# Patient Record
Sex: MALE | Race: ASIAN | NOT HISPANIC OR LATINO | Employment: FULL TIME | URBAN - METROPOLITAN AREA
[De-identification: names, ages, dates, MRNs, and addresses within clinical notes are randomized per-mention and may not be internally consistent; named-entity substitution may affect disease eponyms.]

---

## 2018-01-07 DIAGNOSIS — I10 BENIGN ESSENTIAL HYPERTENSION: ICD-10-CM

## 2018-01-07 DIAGNOSIS — E78.5 HYPERLIPIDEMIA, UNSPECIFIED HYPERLIPIDEMIA TYPE: ICD-10-CM

## 2018-01-08 RX ORDER — ROSUVASTATIN CALCIUM 40 MG/1
TABLET, COATED ORAL
Qty: 90 TABLET | Refills: 2 | OUTPATIENT
Start: 2018-01-08

## 2018-01-08 RX ORDER — VALSARTAN 160 MG/1
TABLET ORAL
Qty: 90 TABLET | Refills: 2 | OUTPATIENT
Start: 2018-01-08

## 2018-01-15 DIAGNOSIS — E78.5 HYPERLIPIDEMIA, UNSPECIFIED HYPERLIPIDEMIA TYPE: ICD-10-CM

## 2018-01-15 RX ORDER — ROSUVASTATIN CALCIUM 40 MG/1
TABLET, COATED ORAL
Qty: 30 TABLET | Refills: 0 | Status: SHIPPED | OUTPATIENT
Start: 2018-01-15 | End: 2018-02-09 | Stop reason: SDUPTHER

## 2018-01-23 DIAGNOSIS — Z00.00 ROUTINE PHYSICAL EXAMINATION: Primary | ICD-10-CM

## 2018-01-23 DIAGNOSIS — R73.01 IMPAIRED FASTING GLUCOSE: ICD-10-CM

## 2018-01-23 DIAGNOSIS — E55.9 VITAMIN D DEFICIENCY: ICD-10-CM

## 2018-01-28 LAB — TSH SERPL DL<=0.005 MIU/L-ACNC: 0.22 UIU/ML (ref 0.45–4.5)

## 2018-01-31 LAB
25(OH)D3+25(OH)D2 SERPL-MCNC: 35.5 NG/ML (ref 30–100)
ALBUMIN SERPL-MCNC: 4.5 G/DL (ref 3.5–5.5)
ALBUMIN/CREAT UR: 2.1 MG/G CREAT (ref 0–30)
ALBUMIN/GLOB SERPL: 2.3 {RATIO} (ref 1.2–2.2)
ALP SERPL-CCNC: 84 IU/L (ref 39–117)
ALT SERPL-CCNC: 15 IU/L (ref 0–44)
AST SERPL-CCNC: 15 IU/L (ref 0–40)
BILIRUB SERPL-MCNC: 0.2 MG/DL (ref 0–1.2)
BUN SERPL-MCNC: 20 MG/DL (ref 6–24)
BUN/CREAT SERPL: 23 (ref 9–20)
CALCIUM SERPL-MCNC: 9.3 MG/DL (ref 8.7–10.2)
CHLORIDE SERPL-SCNC: 104 MMOL/L (ref 96–106)
CHOLEST SERPL-MCNC: 161 MG/DL (ref 100–199)
CK SERPL-CCNC: 118 U/L (ref 24–204)
CO2 SERPL-SCNC: 26 MMOL/L (ref 18–29)
CREAT SERPL-MCNC: 0.86 MG/DL (ref 0.76–1.27)
CREAT UR-MCNC: 212.1 MG/DL
ERYTHROCYTE [DISTWIDTH] IN BLOOD BY AUTOMATED COUNT: 13 % (ref 12.3–15.4)
GFR SERPLBLD CREATININE-BSD FMLA CKD-EPI: 103 ML/MIN/1.73
GFR SERPLBLD CREATININE-BSD FMLA CKD-EPI: 119 ML/MIN/1.73
GLOBULIN SER CALC-MCNC: 2 G/DL (ref 1.5–4.5)
GLUCOSE SERPL-MCNC: 117 MG/DL (ref 65–99)
HBA1C MFR BLD: 5.7 % (ref 4.8–5.6)
HCT VFR BLD AUTO: 42.3 % (ref 37.5–51)
HDL SERPL-SCNC: 32.1 UMOL/L
HDLC SERPL-MCNC: 38 MG/DL
HGB BLD-MCNC: 13.7 G/DL (ref 13–17.7)
LDL SERPL QN: 20.8 NM
LDL SERPL-SCNC: 1578 NMOL/L
LDL SMALL SERPL-SCNC: 739 NMOL/L
LDLC SERPL CALC-MCNC: 113 MG/DL (ref 0–99)
MCH RBC QN AUTO: 28.2 PG (ref 26.6–33)
MCHC RBC AUTO-ENTMCNC: 32.4 G/DL (ref 31.5–35.7)
MCV RBC AUTO: 87 FL (ref 79–97)
MICROALBUMIN UR-MCNC: 4.5 UG/ML
PLATELET # BLD AUTO: 227 X10E3/UL (ref 150–379)
POTASSIUM SERPL-SCNC: 4.5 MMOL/L (ref 3.5–5.2)
PROT SERPL-MCNC: 6.5 G/DL (ref 6–8.5)
PSA SERPL-MCNC: 0.7 NG/ML (ref 0–4)
RBC # BLD AUTO: 4.86 X10E6/UL (ref 4.14–5.8)
SODIUM SERPL-SCNC: 146 MMOL/L (ref 134–144)
T3FREE SERPL-MCNC: 3 PG/ML (ref 2–4.4)
T4 FREE SERPL-MCNC: 1.57 NG/DL (ref 0.82–1.77)
TRIGL SERPL-MCNC: 49 MG/DL (ref 0–149)
WBC # BLD AUTO: 4.8 X10E3/UL (ref 3.4–10.8)

## 2018-02-09 ENCOUNTER — OFFICE VISIT (OUTPATIENT)
Dept: INTERNAL MEDICINE | Facility: CLINIC | Age: 48
End: 2018-02-09

## 2018-02-09 VITALS
WEIGHT: 159.2 LBS | HEART RATE: 74 BPM | SYSTOLIC BLOOD PRESSURE: 120 MMHG | OXYGEN SATURATION: 99 % | HEIGHT: 64 IN | DIASTOLIC BLOOD PRESSURE: 70 MMHG | BODY MASS INDEX: 27.18 KG/M2

## 2018-02-09 DIAGNOSIS — K62.0 ANAL POLYP: ICD-10-CM

## 2018-02-09 DIAGNOSIS — Z51.81 THERAPEUTIC DRUG MONITORING: ICD-10-CM

## 2018-02-09 DIAGNOSIS — Z00.00 ROUTINE PHYSICAL EXAMINATION: Primary | ICD-10-CM

## 2018-02-09 DIAGNOSIS — I10 BENIGN ESSENTIAL HYPERTENSION: Chronic | ICD-10-CM

## 2018-02-09 DIAGNOSIS — R73.01 IMPAIRED FASTING GLUCOSE: Chronic | ICD-10-CM

## 2018-02-09 DIAGNOSIS — T46.4X5A ACE-INHIBITOR COUGH: Chronic | ICD-10-CM

## 2018-02-09 DIAGNOSIS — Z83.71 FAMILY HISTORY OF COLONIC POLYPS: ICD-10-CM

## 2018-02-09 DIAGNOSIS — R05.8 ACE-INHIBITOR COUGH: Chronic | ICD-10-CM

## 2018-02-09 DIAGNOSIS — E78.5 HYPERLIPIDEMIA, UNSPECIFIED HYPERLIPIDEMIA TYPE: Chronic | ICD-10-CM

## 2018-02-09 DIAGNOSIS — E55.9 VITAMIN D DEFICIENCY: Chronic | ICD-10-CM

## 2018-02-09 DIAGNOSIS — E03.8 SECONDARY HYPOTHYROIDISM: Chronic | ICD-10-CM

## 2018-02-09 PROBLEM — Z83.719 FAMILY HISTORY OF COLONIC POLYPS: Status: ACTIVE | Noted: 2018-02-09

## 2018-02-09 PROCEDURE — 99396 PREV VISIT EST AGE 40-64: CPT | Performed by: INTERNAL MEDICINE

## 2018-02-09 RX ORDER — LEVOTHYROXINE SODIUM 0.07 MG/1
TABLET ORAL
Qty: 90 TABLET | Refills: 3 | Status: SHIPPED | OUTPATIENT
Start: 2018-02-09 | End: 2019-01-06 | Stop reason: SDUPTHER

## 2018-02-09 RX ORDER — VALSARTAN 160 MG/1
TABLET ORAL
Qty: 90 TABLET | Refills: 3 | Status: SHIPPED | OUTPATIENT
Start: 2018-02-09 | End: 2018-08-15 | Stop reason: ALTCHOICE

## 2018-02-09 RX ORDER — ROSUVASTATIN CALCIUM 40 MG/1
TABLET, COATED ORAL
Qty: 90 TABLET | Refills: 3 | Status: SHIPPED | OUTPATIENT
Start: 2018-02-09 | End: 2018-03-07 | Stop reason: SDUPTHER

## 2018-02-09 RX ORDER — EZETIMIBE 10 MG/1
TABLET ORAL
Qty: 90 TABLET | Refills: 3 | Status: SHIPPED | OUTPATIENT
Start: 2018-02-09 | End: 2019-02-15

## 2018-02-09 NOTE — PROGRESS NOTES
02/09/2018    Patient Information  Gustavo Mcclendon                                                                                          [address] [facility] [address]      1970  [phone]      Chief Complaint:     Routine annual physical examination and follow-up lab work.  No new acute complaints.    History of Present Illness:    Patient with a history of hyperlipidemia, impaired fasting glucose, secondary hypothyroidism, vitamin D deficiency, ACE inhibitor cough.  He presents for his routine annual physical exam and follow-up lab work.  He is tolerating his medications well and has no new acute complaints.  He did have a recent episode of influenza which was treated with Tamiflu and resulted in resolution.  Past medical history reviewed and updated where necessary including health maintenance parameters.  This reveals he is up-to-date with the exception of a TDaP.    Review of Systems   Constitution: Negative.   HENT: Negative.    Eyes: Negative.    Cardiovascular: Negative.    Respiratory: Negative.    Endocrine: Negative.    Hematologic/Lymphatic: Negative.    Skin: Negative.    Musculoskeletal: Negative.    Gastrointestinal: Negative.    Genitourinary: Negative.    Neurological: Negative.    Psychiatric/Behavioral: Negative.    Allergic/Immunologic: Negative.        Active Problems:    Patient Active Problem List   Diagnosis   • Benign essential hypertension   • Hyperlipidemia   • Impaired fasting glucose   • Secondary hypothyroidism   • Vitamin D deficiency   • Therapeutic drug monitoring   • Routine physical examination   • ACE-inhibitor cough         Past Medical History:   Diagnosis Date   • ACE-inhibitor cough 4/22/2016 04/22/2016--patient presents with a several month history of a dry cough related to the use of lisinopril.   • Benign essential hypertension 5/14/2009 05/14/2009--treatment for hypertension begun.   • History of cardiovascular stress test  05/19/2009 05/19/2009--treadmill exercise stress test was asymptomatic and negative for ischemia.   • Hyperlipidemia 1/1/1997 1997--treatment for hyperlipidemia begun.   • Impaired fasting glucose 2/18/2011 04/22/2011--initial hemoglobin A1c 6.3.  02/18/2011--initial diagnosis of impaired fasting glucose with a serum glucose of 111.   • Secondary hypothyroidism 5/31/2013 04/25/2014--correction: patient did have a contrasted MRI of the pituitary 08/19/2013 and it did not show any evidence of pituitary tumor.  04/19/2014--TSH is low at 0.363, normal range 0.45--4.5. T4 borderline at 4.5, normal range 4.5--12.0. Free thyroxine index normal at 1.7, normal range 1.2--4.9. Patient reports he has symptoms of fatigue as well as cold intolerance and constipation and would like to try supplementation. Levothyroxine 75 mcg per day initiated. Followup in 5 weeks with lab prior.   10/21/2013--patient was seen in consultation by the endocrinologist and repeat thyroid function tests were performed. 3 T3 was normal at 3.1. Thyroid antibodies were normal. TSH was low normal range at 0.452. Free T4 was normal at 1.36, normal range 0.8 to--1.77. Patient was felt to be euthyroid and only needed continued observation.   05/31/2013--TSH was low at 0.390. Question secondary hypothyroid. Repeat thyroid function tests revealed TSH to be normal at 0.46, normal range 0.27--4.20. T3 was low at 78.   • Vitamin D deficiency 3/28/2016         Past Surgical History:   Procedure Laterality Date   • DENTAL PROCEDURE  2012 2012--dental implant.         No Known Allergies        Current Outpatient Prescriptions:   •  aspirin 81 MG tablet, Take 1 tablet by mouth daily., Disp: , Rfl:   •  Cholecalciferol (VITAMIN D3) 5000 UNITS capsule capsule, Take 1 capsule by mouth daily., Disp: , Rfl:   •  ezetimibe (ZETIA) 10 MG tablet, 1 by mouth daily for cholesterol, Disp: 90 tablet, Rfl: 3  •  levothyroxine (SYNTHROID, LEVOTHROID) 75 MCG tablet,  "1 by mouth daily for thyroid, Disp: 90 tablet, Rfl: 3  •  rosuvastatin (CRESTOR) 40 MG tablet, 1 by mouth daily for cholesterol.  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  HEENT: pupils equal, round, and reactive to light; extraocular movements intact; sclera nonicteric; nasal mucosa normal; pharynx normal; tympanic membranes and ear canals normal.  Neck: without JVD, thyromegaly, bruit, or adenopathy.  Lungs: clear to auscultation in all fields.  Heart: auscultation reveals regular rate and rhythm without murmur, rub, gallop, or click.  Abdomen: is soft and nontender, without hepato-splenomegaly, mass or hernia. Normal bowel sounds; .  Urologic exam: reveals normal male genitalia without testicular mass or penile/scrotal lesion.  Digital rectal exam and Prostate: deferred.  Extremities: are without clubbing, cyanosis, or edema.  Vascular: no signs of peripheral arterial disease or venous insufficiency/varicosities.  Neurological: intact without focal deficit, including cranial " and peripheral nerves.  Station and gait observed to be normal during ingress and egress from the examination area.  Sensation and deep tendon reflexes tested if clinically indicated and are normal.  Musculoskeletal: exam is normal, without signs of synovitis, significant degeneration or deformity. Skin examination: without rash or significant lesions.      Lab/other results:    NMR reveals a total cholesterol 161.  Triglycerides 49.  LDL particle number elevated at 1578.  Small LDL particle number elevated at 739.  HDL particle number is normal at 32.1.  CMP normal except blood sugar elevated at 117, serum sodium slightly elevated at 146.  CBC is normal.  Albumin/creatinine ratio was normal.  Hemoglobin A1c 5.7.  Thyroid function tests normal.  Vitamin D normal.  PSA normal at 0.7.  CPK normal.  TSH is low at 0.218.    Assessment/Plan:     Diagnosis Plan   1. Routine physical examination     2. Secondary hypothyroidism     3. Impaired fasting glucose     4. Hyperlipidemia, unspecified hyperlipidemia type     5. Benign essential hypertension     6. Vitamin D deficiency     7. ACE-inhibitor cough     8. Therapeutic drug monitoring       Patient presents with essentially normal physical exam except for the following issues: His thyroid is therapeutic.  He does have mild impaired fasting glucose at does not require medication.  Cholesterol profile is not at goal.  He has been out of his medication for a week or 2.  Blood pressure seems to be controlled.  Vitamin D therapeutic.  Patient has concerns or guarding and anal polyp plus he has a family history of colon polyps and therefore I think colonoscopy is justified.    Plan is as follows: Referral for colonoscopy and evaluation of anal polyp.  Work on diet and weight loss.  Patient will follow-up in one year with lab prior for his annual exam.        Procedures

## 2018-03-07 DIAGNOSIS — E78.5 HYPERLIPIDEMIA, UNSPECIFIED HYPERLIPIDEMIA TYPE: Chronic | ICD-10-CM

## 2018-03-07 RX ORDER — ROSUVASTATIN CALCIUM 40 MG/1
TABLET, COATED ORAL
Qty: 90 TABLET | Refills: 3 | Status: SHIPPED | OUTPATIENT
Start: 2018-03-07 | End: 2019-02-15 | Stop reason: SDUPTHER

## 2018-03-20 DIAGNOSIS — E78.5 HYPERLIPIDEMIA, UNSPECIFIED HYPERLIPIDEMIA TYPE: ICD-10-CM

## 2018-03-20 RX ORDER — EZETIMIBE 10 MG/1
TABLET ORAL
Qty: 90 TABLET | Refills: 2 | Status: SHIPPED | OUTPATIENT
Start: 2018-03-20 | End: 2019-02-15 | Stop reason: SDUPTHER

## 2018-08-15 RX ORDER — IRBESARTAN 150 MG/1
150 TABLET ORAL NIGHTLY
Qty: 30 TABLET | Refills: 1 | Status: SHIPPED | OUTPATIENT
Start: 2018-08-15 | End: 2018-11-08 | Stop reason: SDUPTHER

## 2018-11-08 RX ORDER — IRBESARTAN 150 MG/1
150 TABLET ORAL NIGHTLY
Qty: 90 TABLET | Refills: 0 | Status: SHIPPED | OUTPATIENT
Start: 2018-11-08 | End: 2019-02-06 | Stop reason: SDUPTHER

## 2019-01-06 DIAGNOSIS — E03.8 SECONDARY HYPOTHYROIDISM: Chronic | ICD-10-CM

## 2019-01-07 RX ORDER — LEVOTHYROXINE SODIUM 0.07 MG/1
TABLET ORAL
Qty: 90 TABLET | Refills: 0 | Status: SHIPPED | OUTPATIENT
Start: 2019-01-07 | End: 2019-02-15 | Stop reason: SDUPTHER

## 2019-02-06 RX ORDER — IRBESARTAN 150 MG/1
TABLET ORAL
Qty: 90 TABLET | Refills: 0 | Status: SHIPPED | OUTPATIENT
Start: 2019-02-06 | End: 2019-02-15 | Stop reason: SDUPTHER

## 2019-02-09 ENCOUNTER — RESULTS ENCOUNTER (OUTPATIENT)
Dept: INTERNAL MEDICINE | Facility: CLINIC | Age: 49
End: 2019-02-09

## 2019-02-09 DIAGNOSIS — R73.01 IMPAIRED FASTING GLUCOSE: Chronic | ICD-10-CM

## 2019-02-09 DIAGNOSIS — E78.5 HYPERLIPIDEMIA, UNSPECIFIED HYPERLIPIDEMIA TYPE: Chronic | ICD-10-CM

## 2019-02-09 DIAGNOSIS — Z00.00 ROUTINE PHYSICAL EXAMINATION: ICD-10-CM

## 2019-02-11 LAB
25(OH)D3+25(OH)D2 SERPL-MCNC: 33.8 NG/ML (ref 30–100)
ALBUMIN SERPL-MCNC: 4.4 G/DL (ref 3.5–5.5)
ALBUMIN/GLOB SERPL: 1.9 {RATIO} (ref 1.2–2.2)
ALP SERPL-CCNC: 90 IU/L (ref 39–117)
ALT SERPL-CCNC: 20 IU/L (ref 0–44)
APPEARANCE UR: ABNORMAL
AST SERPL-CCNC: 17 IU/L (ref 0–40)
BACTERIA #/AREA URNS HPF: ABNORMAL /[HPF]
BILIRUB SERPL-MCNC: 0.4 MG/DL (ref 0–1.2)
BILIRUB UR QL STRIP: NEGATIVE
BUN SERPL-MCNC: 17 MG/DL (ref 6–24)
BUN/CREAT SERPL: 21 (ref 9–20)
CALCIUM SERPL-MCNC: 9.1 MG/DL (ref 8.7–10.2)
CHLORIDE SERPL-SCNC: 103 MMOL/L (ref 96–106)
CHOLEST SERPL-MCNC: 129 MG/DL (ref 100–199)
CK SERPL-CCNC: 100 U/L (ref 24–204)
CO2 SERPL-SCNC: 27 MMOL/L (ref 20–29)
COLOR UR: YELLOW
CREAT SERPL-MCNC: 0.81 MG/DL (ref 0.76–1.27)
CRYSTALS URNS MICRO: ABNORMAL
EPI CELLS #/AREA URNS HPF: ABNORMAL /HPF
ERYTHROCYTE [DISTWIDTH] IN BLOOD BY AUTOMATED COUNT: 12.9 % (ref 12.3–15.4)
GLOBULIN SER CALC-MCNC: 2.3 G/DL (ref 1.5–4.5)
GLUCOSE SERPL-MCNC: 116 MG/DL (ref 65–99)
GLUCOSE UR QL: NEGATIVE
HBA1C MFR BLD: 6.1 % (ref 4.8–5.6)
HCT VFR BLD AUTO: 42.3 % (ref 37.5–51)
HDL SERPL-SCNC: 30.9 UMOL/L
HDLC SERPL-MCNC: 44 MG/DL
HGB BLD-MCNC: 13.8 G/DL (ref 13–17.7)
HGB UR QL STRIP: ABNORMAL
KETONES UR QL STRIP: NEGATIVE
LDL SERPL QN: 20.2 NM
LDL SERPL-SCNC: 1007 NMOL/L
LDL SMALL SERPL-SCNC: 555 NMOL/L
LDLC SERPL CALC-MCNC: 74 MG/DL (ref 0–99)
LEUKOCYTE ESTERASE UR QL STRIP: NEGATIVE
MCH RBC QN AUTO: 28.5 PG (ref 26.6–33)
MCHC RBC AUTO-ENTMCNC: 32.6 G/DL (ref 31.5–35.7)
MCV RBC AUTO: 87 FL (ref 79–97)
MICRO URNS: ABNORMAL
MUCOUS THREADS URNS QL MICRO: PRESENT
NITRITE UR QL STRIP: NEGATIVE
PH UR STRIP: 7.5 [PH] (ref 5–7.5)
PLATELET # BLD AUTO: 237 X10E3/UL (ref 150–379)
POTASSIUM SERPL-SCNC: 4.4 MMOL/L (ref 3.5–5.2)
PROT SERPL-MCNC: 6.7 G/DL (ref 6–8.5)
PROT UR QL STRIP: NEGATIVE
PSA SERPL-MCNC: 0.6 NG/ML (ref 0–4)
RBC # BLD AUTO: 4.84 X10E6/UL (ref 4.14–5.8)
RBC #/AREA URNS HPF: ABNORMAL /HPF
SODIUM SERPL-SCNC: 143 MMOL/L (ref 134–144)
SP GR UR: 1.02 (ref 1–1.03)
T3FREE SERPL-MCNC: 3.2 PG/ML (ref 2–4.4)
T4 FREE SERPL-MCNC: 1.85 NG/DL (ref 0.82–1.77)
TRIGL SERPL-MCNC: 54 MG/DL (ref 0–149)
TSH SERPL DL<=0.005 MIU/L-ACNC: 0.11 UIU/ML (ref 0.45–4.5)
UNIDENT CRYS URNS QL MICRO: PRESENT
UROBILINOGEN UR STRIP-MCNC: 0.2 MG/DL (ref 0.2–1)
WBC # BLD AUTO: 5.3 X10E3/UL (ref 3.4–10.8)
WBC #/AREA URNS HPF: ABNORMAL /HPF

## 2019-02-15 ENCOUNTER — OFFICE VISIT (OUTPATIENT)
Dept: INTERNAL MEDICINE | Facility: CLINIC | Age: 49
End: 2019-02-15

## 2019-02-15 VITALS
SYSTOLIC BLOOD PRESSURE: 124 MMHG | HEART RATE: 70 BPM | OXYGEN SATURATION: 98 % | BODY MASS INDEX: 27.08 KG/M2 | WEIGHT: 158.6 LBS | HEIGHT: 64 IN | DIASTOLIC BLOOD PRESSURE: 70 MMHG

## 2019-02-15 DIAGNOSIS — R73.01 IMPAIRED FASTING GLUCOSE: Chronic | ICD-10-CM

## 2019-02-15 DIAGNOSIS — Z83.71 FAMILY HISTORY OF COLONIC POLYPS: Chronic | ICD-10-CM

## 2019-02-15 DIAGNOSIS — E03.8 SECONDARY HYPOTHYROIDISM: Chronic | ICD-10-CM

## 2019-02-15 DIAGNOSIS — Z00.00 ROUTINE PHYSICAL EXAMINATION: Primary | ICD-10-CM

## 2019-02-15 DIAGNOSIS — E55.9 VITAMIN D DEFICIENCY: Chronic | ICD-10-CM

## 2019-02-15 DIAGNOSIS — E78.00 HIGH CHOLESTEROL: Chronic | ICD-10-CM

## 2019-02-15 DIAGNOSIS — Z12.11 COLON CANCER SCREENING: ICD-10-CM

## 2019-02-15 DIAGNOSIS — E78.5 HYPERLIPIDEMIA, UNSPECIFIED HYPERLIPIDEMIA TYPE: ICD-10-CM

## 2019-02-15 DIAGNOSIS — K62.0 ANAL POLYP: ICD-10-CM

## 2019-02-15 DIAGNOSIS — Z51.81 THERAPEUTIC DRUG MONITORING: ICD-10-CM

## 2019-02-15 DIAGNOSIS — I10 BENIGN ESSENTIAL HYPERTENSION: Chronic | ICD-10-CM

## 2019-02-15 PROBLEM — Z83.719 FAMILY HISTORY OF COLONIC POLYPS: Chronic | Status: ACTIVE | Noted: 2018-02-09

## 2019-02-15 PROCEDURE — 99396 PREV VISIT EST AGE 40-64: CPT | Performed by: INTERNAL MEDICINE

## 2019-02-15 RX ORDER — IRBESARTAN 150 MG/1
TABLET ORAL
Qty: 90 TABLET | Refills: 3 | Status: SHIPPED | OUTPATIENT
Start: 2019-02-15 | End: 2019-11-01 | Stop reason: SDUPTHER

## 2019-02-15 RX ORDER — EZETIMIBE 10 MG/1
TABLET ORAL
Qty: 90 TABLET | Refills: 3 | Status: SHIPPED | OUTPATIENT
Start: 2019-02-15 | End: 2019-11-01 | Stop reason: SDUPTHER

## 2019-02-15 RX ORDER — LEVOTHYROXINE SODIUM 0.07 MG/1
TABLET ORAL
Qty: 90 TABLET | Refills: 3 | Status: SHIPPED | OUTPATIENT
Start: 2019-02-15 | End: 2019-11-01 | Stop reason: SDUPTHER

## 2019-02-15 RX ORDER — ROSUVASTATIN CALCIUM 40 MG/1
TABLET, COATED ORAL
Qty: 90 TABLET | Refills: 3 | Status: SHIPPED | OUTPATIENT
Start: 2019-02-15 | End: 2019-11-01 | Stop reason: SDUPTHER

## 2019-02-15 NOTE — PROGRESS NOTES
02/15/2019    Patient Information  Gustavo Mcclendon                                                                                          [address] [facility] [address]      1970  [unfilled]  There is no work phone number on file.    Chief Complaint:     Routine annual physical examination and follow-up lab work.  No new acute complaints.    History of Present Illness:    Patient with a history of impaired fasting glucose, hypertension, high cholesterol, vitamin D deficiency, secondary hypothyroidism, family history of colon polyps.  He presents today for his routine annual physical exam and follow-up lab work.  He is tolerating his medications well and has no new acute complaints.  His past medical history reviewed and updated were necessary including health maintenance parameters.  This reveals he is up-to-date or else accounted for with the exception of Tdap vaccination and his annual physical.    Review of Systems   Constitution: Negative.   HENT: Negative.    Eyes: Negative.    Cardiovascular: Negative.    Respiratory: Negative.    Endocrine: Negative.    Hematologic/Lymphatic: Negative.    Skin: Negative.    Musculoskeletal: Negative.    Gastrointestinal: Negative.    Genitourinary: Negative.    Neurological: Negative.    Psychiatric/Behavioral: Negative.    Allergic/Immunologic: Negative.        Active Problems:    Patient Active Problem List   Diagnosis   • Benign essential hypertension   • High cholesterol   • Impaired fasting glucose   • Secondary hypothyroidism   • Vitamin D deficiency   • Therapeutic drug monitoring   • Routine physical examination   • ACE-inhibitor cough   • Anal polyp   • Family history of colonic polyps         Past Medical History:   Diagnosis Date   • ACE-inhibitor cough 4/22/2016 04/22/2016--patient presents with a several month history of a dry cough related to the use of lisinopril.   • Benign essential hypertension 5/14/2009     05/14/2009--treatment for hypertension begun.   • Family history of colonic polyps 2/9/2018    Patient's paternal grandfather and paternal uncle had a history of colon polyps.   • High cholesterol 1/1/1997 1997--treatment for hyperlipidemia begun.   • History of cardiovascular stress test 05/19/2009 05/19/2009--treadmill exercise stress test was asymptomatic and negative for ischemia.   • Impaired fasting glucose 2/18/2011 04/22/2011--initial hemoglobin A1c 6.3.  02/18/2011--initial diagnosis of impaired fasting glucose with a serum glucose of 111.   • Secondary hypothyroidism 5/31/2013 04/25/2014--correction: patient did have a contrasted MRI of the pituitary 08/19/2013 and it did not show any evidence of pituitary tumor.  04/19/2014--TSH is low at 0.363, normal range 0.45--4.5. T4 borderline at 4.5, normal range 4.5--12.0. Free thyroxine index normal at 1.7, normal range 1.2--4.9. Patient reports he has symptoms of fatigue as well as cold intolerance and constipation and would like to try supplementation. Levothyroxine 75 mcg per day initiated. Followup in 5 weeks with lab prior.   10/21/2013--patient was seen in consultation by the endocrinologist and repeat thyroid function tests were performed. 3 T3 was normal at 3.1. Thyroid antibodies were normal. TSH was low normal range at 0.452. Free T4 was normal at 1.36, normal range 0.8 to--1.77. Patient was felt to be euthyroid and only needed continued observation.   05/31/2013--TSH was low at 0.390. Question secondary hypothyroid. Repeat thyroid function tests revealed TSH to be normal at 0.46, normal range 0.27--4.20. T3 was low at 78.   • Vitamin D deficiency 3/28/2016         Past Surgical History:   Procedure Laterality Date   • DENTAL PROCEDURE  2012 2012--dental implant.         No Known Allergies        Current Outpatient Medications:   •  aspirin 81 MG tablet, Take 1 tablet by mouth daily., Disp: , Rfl:   •  Cholecalciferol (VITAMIN D3) 5000  "UNITS capsule capsule, Take 1 capsule by mouth daily., Disp: , Rfl:   •  ezetimibe (ZETIA) 10 MG tablet, TAKE 1 TABLET BY MOUTH EVERY DAY FOR CHOLESTEROL, Disp: 90 tablet, Rfl: 2  •  irbesartan (AVAPRO) 150 MG tablet, TAKE 1 TABLET BY MOUTH EVERY DAY AT NIGHT, Disp: 90 tablet, Rfl: 0  •  levothyroxine (SYNTHROID, LEVOTHROID) 75 MCG tablet, TAKE 1 TABLET BY MOUTH EVERY DAY FOR THYROID, Disp: 90 tablet, Rfl: 0  •  rosuvastatin (CRESTOR) 40 MG tablet, 1 by mouth daily for cholesterol, Disp: 90 tablet, Rfl: 3      Family History   Problem Relation Age of Onset   • Coronary artery disease Mother    • Diabetes Mother    • Hypertension Brother          Social History     Socioeconomic History   • Marital status:      Spouse name: jose   • Number of children: Not on file   • Years of education: Not on file   • Highest education level: Professional school degree (e.g., MD, DDS, DVM, RICARDA)   Social Needs   • Financial resource strain: Not hard at all   • Food insecurity - worry: Never true   • Food insecurity - inability: Never true   • Transportation needs - medical: No   • Transportation needs - non-medical: No   Occupational History   • Occupation: Psychiatrist - [employer]   Tobacco Use   • Smoking status: Never Smoker   • Smokeless tobacco: Never Used   Substance and Sexual Activity   • Alcohol use: No   • Drug use: No   • Sexual activity: Yes     Partners: Female   Other Topics Concern   • Not on file   Social History Narrative   • Not on file         Vitals:    02/15/19 1602   BP: 124/70   BP Location: Right arm   Pulse: 70   SpO2: 98%   Weight: 71.9 kg (158 lb 9.6 oz)   Height: 162.6 cm (64\")          Physical Exam:    General: Alert and oriented x 3, with appropriate affect; no acute distress.  HEENT: pupils equal, round, and reactive to light; extraocular movements intact; sclera nonicteric; nasal mucosa normal; pharynx normal; tympanic membranes and ear canals normal.  Neck: without JVD, thyromegaly, " bruit, or adenopathy.  Lungs: clear to auscultation in all fields.  Heart: auscultation reveals regular rate and rhythm without murmur, rub, gallop, or click.  Abdomen: is soft and nontender, without hepato-splenomegaly, mass or hernia. Normal bowel sounds; .  Urologic exam: reveals normal male genitalia without testicular mass or penile/scrotal lesion.  Digital rectal exam and Prostate: deferred.  Extremities: are without clubbing, cyanosis, or edema.  Vascular: no signs of peripheral arterial disease or venous insufficiency/varicosities.  Neurological: intact without focal deficit, including cranial and peripheral nerves.  Station and gait observed to be normal during ingress and egress from the examination area.  Sensation and deep tendon reflexes tested if clinically indicated and are normal.  Musculoskeletal: exam is normal, without signs of synovitis, significant degeneration or deformity. Skin examination: without rash or significant lesions.    Lab/other results:    NMR reveals a total cholesterol of only 129.  Triglycerides are normal at 54.  LDL particle number is only slightly elevated at 1007.  Small LDL particle number slightly elevated at 555.  HDL particle number is normal at 30.9.  CMP normal except blood sugar elevated 116.  Urinalysis essentially normal.  Crystals were noted.  CBC normal.  Hemoglobin A1c 6.1.  TSH is low at 0.106 free T4 is slightly elevated at 1.85 but free T3 is normal at 3.2.  PSA is excellent at 0.6.  Vitamin D normal at 33.8.  CPK normal.    Assessment/Plan:     Diagnosis Plan   1. Routine physical examination     2. Impaired fasting glucose  Hemoglobin A1c   3. Benign essential hypertension     4. High cholesterol  Comprehensive Metabolic Panel    CK    NMR LipoProfile   5. Vitamin D deficiency     6. Secondary hypothyroidism  TSH    T4, Free    T3, Free   7. Family history of colonic polyps  Ambulatory Referral For Screening Colonoscopy   8. Therapeutic drug monitoring      9. Anal polyp  Ambulatory Referral For Screening Colonoscopy   10. Colon cancer screening  Ambulatory Referral For Screening Colonoscopy   11. Hyperlipidemia, unspecified hyperlipidemia type       Patient presents with essentially normal annual physical except for the following issues: He has mild impaired fasting glucose it does not require medication.  Blood pressure seems well controlled.  His high cholesterol is under good control.  Vitamin D is therapeutic.  He has secondary hypothyroidism with adequate thyroid function test.  She does have a family history of colon polyps and also concern over an anal polyp and given the new guidelines is due for a screening colonoscopy.    Several preventative health issues discussed including review of vaccinations and recommendations, including dietary issues, exercise and weight loss.  Safe sex practices discussed.  Patient advised to wear seatbelt whenever driving and avoid texting and driving.  Also advised to look both ways before crossing the street.  Colon cancer prevention discussed and is up-to-date with colonoscopy.  Advised to avoid tobacco products and minimize alcohol consumption.    Plan is as follows: Screening colonoscopy ordered.  No change in current medical regimen.  Patient will work on diet, weight loss and exercise.  Patient does have a quite large sebaceous cyst in his right axillary region that needs to be excised.  I am concerned that it will become infected near future.  We will set up a surgery appointment at his convenience in the near future.  Otherwise, patient will follow-up in 6 months with lab prior.    Procedures

## 2019-02-20 ENCOUNTER — TELEPHONE (OUTPATIENT)
Dept: INTERNAL MEDICINE | Facility: CLINIC | Age: 49
End: 2019-02-20

## 2019-02-20 RX ORDER — SULFAMETHOXAZOLE AND TRIMETHOPRIM 800; 160 MG/1; MG/1
TABLET ORAL
Qty: 32 TABLET | Refills: 0 | Status: SHIPPED | OUTPATIENT
Start: 2019-02-20 | End: 2019-03-01

## 2019-02-20 NOTE — TELEPHONE ENCOUNTER
I sent a prescription to his [facility] pharmacy for Bactrim DS 2 p.o. twice daily.  Tell the patient I am giving him a large dose at least initially.  I think he cannot get in to see me any sooner because of his work schedule.  He usually wants an afternoon appointment.

## 2019-02-20 NOTE — TELEPHONE ENCOUNTER
Pt saw you last week and he had a cyst. We scheduled him for surgery on March 1 but I have moved it to this Friday. He states that he thinks it is getting worse and infected. Should he take an antibiotic first? Call [phone].

## 2019-02-22 ENCOUNTER — OFFICE VISIT (OUTPATIENT)
Dept: INTERNAL MEDICINE | Facility: CLINIC | Age: 49
End: 2019-02-22

## 2019-02-22 VITALS — SYSTOLIC BLOOD PRESSURE: 110 MMHG | DIASTOLIC BLOOD PRESSURE: 72 MMHG | HEART RATE: 71 BPM | OXYGEN SATURATION: 96 %

## 2019-02-22 DIAGNOSIS — L72.3 INFECTED SEBACEOUS CYST OF SKIN: ICD-10-CM

## 2019-02-22 DIAGNOSIS — L08.9 INFECTED SEBACEOUS CYST OF SKIN: ICD-10-CM

## 2019-02-22 DIAGNOSIS — L98.9 PAINFUL SKIN LESION: Primary | ICD-10-CM

## 2019-02-22 PROBLEM — K62.0 ANAL POLYP: Status: RESOLVED | Noted: 2018-02-09 | Resolved: 2019-02-22

## 2019-02-22 PROCEDURE — 11406 EXC TR-EXT B9+MARG >4.0 CM: CPT | Performed by: INTERNAL MEDICINE

## 2019-02-22 PROCEDURE — 99212 OFFICE O/P EST SF 10 MIN: CPT | Performed by: INTERNAL MEDICINE

## 2019-02-22 NOTE — PROGRESS NOTES
02/22/2019    Patient Information  Gustavo Mcclendon                                                                                          [address] [address] [place] [address]      1970  [unfilled]  There is no work phone number on file.    Chief Complaint:     Complaining of painful skin lesion in right armpit.    History of Present Illness:    Patient presents today with an enlarging and painful skin lesion in his right axillary region.  Patient reports it started draining a few days ago.  The drainage was very foul-smelling.  No fever, chills, or other systemic signs or symptoms.    Review of Systems   Constitution: Negative. Negative for chills and fever.   HENT: Negative.    Eyes: Negative.    Cardiovascular: Negative.    Respiratory: Negative.    Endocrine: Negative.    Hematologic/Lymphatic: Negative.    Skin: Negative.         Painful skin lesion right axilla   Musculoskeletal: Negative.    Gastrointestinal: Negative.    Genitourinary: Negative.    Neurological: Negative.    Psychiatric/Behavioral: Negative.    Allergic/Immunologic: Negative.        Active Problems:    Patient Active Problem List   Diagnosis   • Benign essential hypertension   • High cholesterol   • Impaired fasting glucose   • Secondary hypothyroidism   • Vitamin D deficiency   • Therapeutic drug monitoring   • Routine physical examination   • ACE-inhibitor cough   • Family history of colonic polyps   • Infected sebaceous cyst of skin   • Painful skin lesion         Past Medical History:   Diagnosis Date   • ACE-inhibitor cough 4/22/2016 04/22/2016--patient presents with a several month history of a dry cough related to the use of lisinopril.   • Benign essential hypertension 5/14/2009 05/14/2009--treatment for hypertension begun.   • Family history of colonic polyps 2/9/2018    Patient's paternal grandfather and paternal uncle had a history of colon polyps.   • High cholesterol 1/1/1997 1997--treatment  for hyperlipidemia begun.   • History of cardiovascular stress test 05/19/2009 05/19/2009--treadmill exercise stress test was asymptomatic and negative for ischemia.   • Impaired fasting glucose 2/18/2011 04/22/2011--initial hemoglobin A1c 6.3.  02/18/2011--initial diagnosis of impaired fasting glucose with a serum glucose of 111.   • Secondary hypothyroidism 5/31/2013 04/25/2014--correction: patient did have a contrasted MRI of the pituitary 08/19/2013 and it did not show any evidence of pituitary tumor.  04/19/2014--TSH is low at 0.363, normal range 0.45--4.5. T4 borderline at 4.5, normal range 4.5--12.0. Free thyroxine index normal at 1.7, normal range 1.2--4.9. Patient reports he has symptoms of fatigue as well as cold intolerance and constipation and would like to try supplementation. Levothyroxine 75 mcg per day initiated. Followup in 5 weeks with lab prior.   10/21/2013--patient was seen in consultation by the endocrinologist and repeat thyroid function tests were performed. 3 T3 was normal at 3.1. Thyroid antibodies were normal. TSH was low normal range at 0.452. Free T4 was normal at 1.36, normal range 0.8 to--1.77. Patient was felt to be euthyroid and only needed continued observation.   05/31/2013--TSH was low at 0.390. Question secondary hypothyroid. Repeat thyroid function tests revealed TSH to be normal at 0.46, normal range 0.27--4.20. T3 was low at 78.   • Vitamin D deficiency 3/28/2016         Past Surgical History:   Procedure Laterality Date   • DENTAL PROCEDURE  2012 2012--dental implant.         No Known Allergies        Current Outpatient Medications:   •  aspirin 81 MG tablet, Take 1 tablet by mouth daily., Disp: , Rfl:   •  Cholecalciferol (VITAMIN D3) 5000 UNITS capsule capsule, Take 1 capsule by mouth daily., Disp: , Rfl:   •  ezetimibe (ZETIA) 10 MG tablet, 1 p.o. daily for high cholesterol, Disp: 90 tablet, Rfl: 3  •  irbesartan (AVAPRO) 150 MG tablet, 1 p.o. daily for high  blood pressure, Disp: 90 tablet, Rfl: 3  •  levothyroxine (SYNTHROID, LEVOTHROID) 75 MCG tablet, 1 p.o. daily as directed for low thyroid, Disp: 90 tablet, Rfl: 3  •  rosuvastatin (CRESTOR) 40 MG tablet, 1 by mouth daily for cholesterol, Disp: 90 tablet, Rfl: 3  •  sulfamethoxazole-trimethoprim (BACTRIM DS,SEPTRA DS) 800-160 MG per tablet, Take 2 p.o. twice daily until gone or otherwise directed, Disp: 32 tablet, Rfl: 0      Family History   Problem Relation Age of Onset   • Coronary artery disease Mother    • Diabetes Mother    • Hypertension Brother          Social History     Socioeconomic History   • Marital status:      Spouse name: jose   • Number of children: Not on file   • Years of education: Not on file   • Highest education level: Professional school degree (e.g., MD, DDS, DVM, RICARDA)   Social Needs   • Financial resource strain: Not hard at all   • Food insecurity - worry: Never true   • Food insecurity - inability: Never true   • Transportation needs - medical: No   • Transportation needs - non-medical: No   Occupational History   • Occupation: Psychiatrist - [employer]   Tobacco Use   • Smoking status: Never Smoker   • Smokeless tobacco: Never Used   Substance and Sexual Activity   • Alcohol use: No   • Drug use: No   • Sexual activity: Yes     Partners: Female   Other Topics Concern   • Not on file   Social History Narrative   • Not on file         Vitals:    02/22/19 1544   BP: 110/72   BP Location: Right arm   Patient Position: Sitting   Cuff Size: Adult   Pulse: 71   SpO2: 96%          Physical Exam:    Brief general exam is unremarkable.  Examination of the right axillary region reveals a nodular subcutaneous skin lesion with a central punctum very consistent with a sebaceous cyst that is infected.  There is some mild surrounding erythema but no overt cellulitis.    Lab/other results:      Assessment/Plan:     Diagnosis Plan   1. Painful skin lesion     2. Infected sebaceous cyst of skin        February 22, 2019--patient presents with a several week history of an enlarging painful skin lesion that he wants me to evaluate today.  On exam he clearly has a very large infected sebaceous cyst in his right axillary region.  I explained to the patient that this cyst would need to be removed in toto, including the sac, in order to completely resolve this issue.     Procedures     Verbal informed consent given.  The infected sebaceous cyst was excised in toto using a #11 blade after prepping with Betadine and anesthetizing with 2% Xylocaine with epinephrine.  The specimen was not sent to path.  The lesion was 5 cm including margins.  The wound was closed with #4, 4-0 Vicryl subcutaneous interrupted sutures and a total of #13, 4-0 nylon interrupted sutures after #3, 4-0 nylon mattress sutures were placed.  After the wound was totally closed I was able to remove the mattress sutures.  Postoperative wound instructions given.  Patient will need sutures out in about 8-10 days.  Bactrim DS 1 p.o. twice daily times 1 week.    Addendum: March 1, 2019--suture removal.  All sutures were removed and the wound looks good considering the infected nature of the cyst and its large size.  Steri-Strips applied and instructions given.  Cellulitis has totally resolved.

## 2019-03-01 ENCOUNTER — CLINICAL SUPPORT (OUTPATIENT)
Dept: INTERNAL MEDICINE | Facility: CLINIC | Age: 49
End: 2019-03-01

## 2019-03-04 NOTE — PROGRESS NOTES
3/1/2019  Pt seen for suture removal.  Incision looked fine.  No sign of infection. Steri strips added. Pt given follow up instructions per Dr Dorsey

## 2019-05-17 ENCOUNTER — OUTSIDE FACILITY SERVICE (OUTPATIENT)
Dept: SURGERY | Facility: CLINIC | Age: 49
End: 2019-05-17

## 2019-05-17 PROCEDURE — 45380 COLONOSCOPY AND BIOPSY: CPT | Performed by: SURGERY

## 2019-05-17 PROCEDURE — 88305 TISSUE EXAM BY PATHOLOGIST: CPT | Performed by: SURGERY

## 2019-05-18 ENCOUNTER — LAB REQUISITION (OUTPATIENT)
Dept: LAB | Facility: HOSPITAL | Age: 49
End: 2019-05-18

## 2019-05-18 DIAGNOSIS — Z12.11 ENCOUNTER FOR SCREENING FOR MALIGNANT NEOPLASM OF COLON: ICD-10-CM

## 2019-05-20 LAB
CYTO UR: NORMAL
LAB AP CASE REPORT: NORMAL
LAB AP CLINICAL INFORMATION: NORMAL
PATH REPORT.FINAL DX SPEC: NORMAL
PATH REPORT.GROSS SPEC: NORMAL

## 2019-09-11 DIAGNOSIS — E03.8 SECONDARY HYPOTHYROIDISM: Chronic | ICD-10-CM

## 2019-09-11 DIAGNOSIS — E78.00 HIGH CHOLESTEROL: Chronic | ICD-10-CM

## 2019-09-11 DIAGNOSIS — R73.01 IMPAIRED FASTING GLUCOSE: Chronic | ICD-10-CM

## 2019-09-23 LAB
ALBUMIN SERPL-MCNC: 4.1 G/DL (ref 3.5–5.5)
ALBUMIN/GLOB SERPL: 1.9 {RATIO} (ref 1.2–2.2)
ALP SERPL-CCNC: 81 IU/L (ref 39–117)
ALT SERPL-CCNC: 15 IU/L (ref 0–44)
AST SERPL-CCNC: 14 IU/L (ref 0–40)
BILIRUB SERPL-MCNC: 0.3 MG/DL (ref 0–1.2)
BUN SERPL-MCNC: 19 MG/DL (ref 6–24)
BUN/CREAT SERPL: 20 (ref 9–20)
CALCIUM SERPL-MCNC: 9.1 MG/DL (ref 8.7–10.2)
CHLORIDE SERPL-SCNC: 105 MMOL/L (ref 96–106)
CHOLEST SERPL-MCNC: 147 MG/DL (ref 100–199)
CK SERPL-CCNC: 107 U/L (ref 24–204)
CO2 SERPL-SCNC: 24 MMOL/L (ref 20–29)
CREAT SERPL-MCNC: 0.94 MG/DL (ref 0.76–1.27)
GLOBULIN SER CALC-MCNC: 2.2 G/DL (ref 1.5–4.5)
GLUCOSE SERPL-MCNC: 118 MG/DL (ref 65–99)
HBA1C MFR BLD: 5.8 % (ref 4.8–5.6)
HDL SERPL-SCNC: 30.7 UMOL/L
HDLC SERPL-MCNC: 45 MG/DL
LDL SERPL QN: 20.7 NM
LDL SERPL-SCNC: 1502 NMOL/L
LDL SMALL SERPL-SCNC: 830 NMOL/L
LDLC SERPL CALC-MCNC: 90 MG/DL (ref 0–99)
POTASSIUM SERPL-SCNC: 4.8 MMOL/L (ref 3.5–5.2)
PROT SERPL-MCNC: 6.3 G/DL (ref 6–8.5)
SODIUM SERPL-SCNC: 143 MMOL/L (ref 134–144)
T3FREE SERPL-MCNC: 2.8 PG/ML (ref 2–4.4)
T4 FREE SERPL-MCNC: 1.57 NG/DL (ref 0.82–1.77)
TRIGL SERPL-MCNC: 59 MG/DL (ref 0–149)
TSH SERPL DL<=0.005 MIU/L-ACNC: 0.25 UIU/ML (ref 0.45–4.5)

## 2019-11-01 ENCOUNTER — HOSPITAL ENCOUNTER (OUTPATIENT)
Dept: GENERAL RADIOLOGY | Facility: HOSPITAL | Age: 49
Discharge: HOME | End: 2019-11-01
Admitting: INTERNAL MEDICINE

## 2019-11-01 ENCOUNTER — OFFICE VISIT (OUTPATIENT)
Dept: INTERNAL MEDICINE | Facility: CLINIC | Age: 49
End: 2019-11-01

## 2019-11-01 VITALS
HEART RATE: 64 BPM | BODY MASS INDEX: 27.14 KG/M2 | HEIGHT: 64 IN | SYSTOLIC BLOOD PRESSURE: 116 MMHG | OXYGEN SATURATION: 98 % | WEIGHT: 159 LBS | DIASTOLIC BLOOD PRESSURE: 68 MMHG

## 2019-11-01 DIAGNOSIS — Z23 NEED FOR TDAP VACCINATION: ICD-10-CM

## 2019-11-01 DIAGNOSIS — E55.9 VITAMIN D DEFICIENCY: Chronic | ICD-10-CM

## 2019-11-01 DIAGNOSIS — Z83.71 FAMILY HISTORY OF COLONIC POLYPS: Chronic | ICD-10-CM

## 2019-11-01 DIAGNOSIS — R73.01 IMPAIRED FASTING GLUCOSE: Chronic | ICD-10-CM

## 2019-11-01 DIAGNOSIS — Z00.00 ROUTINE PHYSICAL EXAMINATION: ICD-10-CM

## 2019-11-01 DIAGNOSIS — Z92.89 HISTORY OF POSITIVE PPD: ICD-10-CM

## 2019-11-01 DIAGNOSIS — E78.5 HYPERLIPIDEMIA, UNSPECIFIED HYPERLIPIDEMIA TYPE: ICD-10-CM

## 2019-11-01 DIAGNOSIS — I10 BENIGN ESSENTIAL HYPERTENSION: Chronic | ICD-10-CM

## 2019-11-01 DIAGNOSIS — R73.01 IMPAIRED FASTING GLUCOSE: Primary | Chronic | ICD-10-CM

## 2019-11-01 DIAGNOSIS — Z51.81 THERAPEUTIC DRUG MONITORING: ICD-10-CM

## 2019-11-01 DIAGNOSIS — Z86.010 HISTORY OF COLON POLYPS: Chronic | ICD-10-CM

## 2019-11-01 DIAGNOSIS — E03.8 SECONDARY HYPOTHYROIDISM: Chronic | ICD-10-CM

## 2019-11-01 PROBLEM — Z86.0100 HISTORY OF COLON POLYPS: Status: ACTIVE | Noted: 2019-11-01

## 2019-11-01 PROBLEM — L98.9 PAINFUL SKIN LESION: Status: RESOLVED | Noted: 2019-02-22 | Resolved: 2019-11-01

## 2019-11-01 PROBLEM — L72.3 INFECTED SEBACEOUS CYST OF SKIN: Status: RESOLVED | Noted: 2019-02-22 | Resolved: 2019-11-01

## 2019-11-01 PROBLEM — L08.9 INFECTED SEBACEOUS CYST OF SKIN: Status: RESOLVED | Noted: 2019-02-22 | Resolved: 2019-11-01

## 2019-11-01 PROBLEM — Z86.0100 HISTORY OF COLON POLYPS: Chronic | Status: ACTIVE | Noted: 2019-11-01

## 2019-11-01 PROCEDURE — 71046 X-RAY EXAM CHEST 2 VIEWS: CPT

## 2019-11-01 PROCEDURE — 99214 OFFICE O/P EST MOD 30 MIN: CPT | Performed by: INTERNAL MEDICINE

## 2019-11-01 PROCEDURE — 90471 IMMUNIZATION ADMIN: CPT | Performed by: INTERNAL MEDICINE

## 2019-11-01 PROCEDURE — 90715 TDAP VACCINE 7 YRS/> IM: CPT | Performed by: INTERNAL MEDICINE

## 2019-11-01 RX ORDER — IRBESARTAN 150 MG/1
TABLET ORAL
Qty: 90 TABLET | Refills: 3 | Status: SHIPPED | OUTPATIENT
Start: 2019-11-01 | End: 2020-05-11 | Stop reason: SDUPTHER

## 2019-11-01 RX ORDER — EZETIMIBE 10 MG/1
TABLET ORAL
Qty: 90 TABLET | Refills: 3 | Status: SHIPPED | OUTPATIENT
Start: 2019-11-01 | End: 2020-05-11 | Stop reason: SDUPTHER

## 2019-11-01 RX ORDER — ROSUVASTATIN CALCIUM 40 MG/1
TABLET, COATED ORAL
Qty: 90 TABLET | Refills: 3 | Status: SHIPPED | OUTPATIENT
Start: 2019-11-01 | End: 2020-05-11 | Stop reason: SDUPTHER

## 2019-11-01 RX ORDER — LEVOTHYROXINE SODIUM 0.07 MG/1
TABLET ORAL
Qty: 90 TABLET | Refills: 3 | Status: SHIPPED | OUTPATIENT
Start: 2019-11-01 | End: 2020-05-11 | Stop reason: SDUPTHER

## 2019-11-01 NOTE — PROGRESS NOTES
11/01/2019    Patient Information  Gustavo Mcclendon                                                                                          [id number] [address] [address]      1970  [unfilled]  There is no work phone number on file.    Chief Complaint:     Follow-up impaired fasting glucose, hyperlipidemia, hypertension, secondary hypothyroidism, vitamin D deficiency, family history: Polyps and personal history of colon polyps, recent colonoscopy.  No new acute complaints.    History of Present Illness:    Patient with a history of medical problems as outlined in the chief complaint presents today for follow-up lab prior in order to monitor his chronic medical issues.  His past medical history reviewed and updated were necessary including health maintenance parameters.  This reveals he will be up-to-date or else accounted for after today's visit.    Review of Systems   Constitution: Negative.   HENT: Negative.    Eyes: Negative.    Cardiovascular: Negative.    Respiratory: Negative.    Endocrine: Negative.    Hematologic/Lymphatic: Negative.    Skin: Negative.    Musculoskeletal: Negative.    Gastrointestinal: Negative.    Genitourinary: Negative.    Neurological: Negative.    Psychiatric/Behavioral: Negative.    Allergic/Immunologic: Negative.        Active Problems:    Patient Active Problem List   Diagnosis   • Benign essential hypertension   • Hyperlipidemia   • Impaired fasting glucose   • Secondary hypothyroidism   • Vitamin D deficiency   • Therapeutic drug monitoring   • Routine physical examination   • ACE-inhibitor cough   • Family history of colonic polyps   • History of colon polyps, 5/17/2019--tubular adenoma x2         Past Medical History:   Diagnosis Date   • ACE-inhibitor cough 4/22/2016 04/22/2016--patient presents with a several month history of a dry cough related to the use of lisinopril.   • Benign essential hypertension 5/14/2009 05/14/2009--treatment for  hypertension begun.   • Family history of colonic polyps 2/9/2018    Patient's paternal grandfather and paternal uncle had a history of colon polyps.   • History of colon polyps, 5/17/2019--tubular adenoma x2 11/1/2019    May 17, 2019--colonoscopy revealed a cecal polyp and a splenic flexure polyp.  Pathology returned tubular adenoma x2.   • Hyperlipidemia 1/1/1997 1997--treatment for hyperlipidemia begun.   • Impaired fasting glucose 2/18/2011 04/22/2011--initial hemoglobin A1c 6.3.  02/18/2011--initial diagnosis of impaired fasting glucose with a serum glucose of 111.   • Secondary hypothyroidism 5/31/2013 04/25/2014--correction: patient did have a contrasted MRI of the pituitary 08/19/2013 and it did not show any evidence of pituitary tumor.  04/19/2014--TSH is low at 0.363, normal range 0.45--4.5. T4 borderline at 4.5, normal range 4.5--12.0. Free thyroxine index normal at 1.7, normal range 1.2--4.9. Patient reports he has symptoms of fatigue as well as cold intolerance and constipation and would like to try supplementation. Levothyroxine 75 mcg per day initiated. Followup in 5 weeks with lab prior.   10/21/2013--patient was seen in consultation by the endocrinologist and repeat thyroid function tests were performed. 3 T3 was normal at 3.1. Thyroid antibodies were normal. TSH was low normal range at 0.452. Free T4 was normal at 1.36, normal range 0.8 to--1.77. Patient was felt to be euthyroid and only needed continued observation.   05/31/2013--TSH was low at 0.390. Question secondary hypothyroid. Repeat thyroid function tests revealed TSH to be normal at 0.46, normal range 0.27--4.20. T3 was low at 78.   • Vitamin D deficiency 3/28/2016         Past Surgical History:   Procedure Laterality Date   • COLONOSCOPY  05/17/2019    May 17, 2019--colonoscopy revealed a cecal polyp and a splenic flexure polyp.  Pathology returned tubular adenoma x2.   • DENTAL PROCEDURE  2012 2012--dental implant.          No Known Allergies        Current Outpatient Medications:   •  aspirin 81 MG tablet, Take 1 tablet by mouth daily., Disp: , Rfl:   •  Cholecalciferol (VITAMIN D3) 5000 UNITS capsule capsule, Take 1 capsule by mouth daily., Disp: , Rfl:   •  ezetimibe (ZETIA) 10 MG tablet, 1 p.o. daily for high cholesterol, Disp: 90 tablet, Rfl: 3  •  irbesartan (AVAPRO) 150 MG tablet, 1 p.o. daily for high blood pressure, Disp: 90 tablet, Rfl: 3  •  levothyroxine (SYNTHROID, LEVOTHROID) 75 MCG tablet, 1 p.o. daily as directed for low thyroid, Disp: 90 tablet, Rfl: 3  •  rosuvastatin (CRESTOR) 40 MG tablet, 1 by mouth daily for cholesterol, Disp: 90 tablet, Rfl: 3      Family History   Problem Relation Age of Onset   • Coronary artery disease Mother    • Diabetes Mother    • Hypertension Brother          Social History     Socioeconomic History   • Marital status:      Spouse name: jose   • Number of children: Not on file   • Years of education: Not on file   • Highest education level: Professional school degree (e.g., MD, DDS, DVM, RICARDA)   Occupational History   • Occupation: Psychiatrist - [employer]   Social Needs   • Financial resource strain: Not hard at all   • Food insecurity:     Worry: Never true     Inability: Never true   • Transportation needs:     Medical: No     Non-medical: No   Tobacco Use   • Smoking status: Never Smoker   • Smokeless tobacco: Never Used   Substance and Sexual Activity   • Alcohol use: No   • Drug use: No   • Sexual activity: Yes     Partners: Female   Lifestyle   • Physical activity:     Days per week: 2 days     Minutes per session: 30 min   • Stress: Not at all   Relationships   • Social connections:     Talks on phone: Patient refused     Gets together: Patient refused     Attends [employer] service: Patient refused     Active member of club or organization: Patient refused     Attends meetings of clubs or organizations: Patient refused     Relationship status: Patient refused  "        Vitals:    11/01/19 1539   BP: 116/68   BP Location: Left arm   Pulse: 64   SpO2: 98%   Weight: 72.1 kg (159 lb)   Height: 162.6 cm (64.02\")          Physical Exam:    General: Alert and oriented x 3.  No acute distress.  Normal affect.  HEENT: Pupils equal, round, reactive to light; extraocular movements intact; sclerae nonicteric; pharynx, ear canals and TMs normal.  Neck: Without JVD, thyromegaly, bruit, or adenopathy.  Lungs: Clear to auscultation in all fields.  Heart: Regular rate and rhythm without murmur, rub, gallop, or click.  Abdomen: Soft, nontender, without hepatosplenomegaly or hernia.  Bowel sounds normal.  : Deferred.  Rectal: Deferred.  Extremities: Without clubbing, cyanosis, edema, or pulse deficit.  Neurologic: Intact without focal deficit.  Normal station and gait observed during ingress and egress from the examination room.  Skin: Without significant lesion.  Musculoskeletal: Unremarkable.    Lab/other results:    NMR reveals a total cholesterol 147.  Triglycerides 59.  LDL particle number elevated borderline at 1502.  Small LDL particle number elevated 830.  HDL particle number normal at 30.7.  CMP normal except blood sugar 118.  Hemoglobin A1c 5.8.  TSH is suppressed slightly at 0.250.  Free T4 is normal at 1.57 and free T3 normal at 2.8.    Assessment/Plan:     Diagnosis Plan   1. Impaired fasting glucose  Hemoglobin A1c   2. Hyperlipidemia, unspecified hyperlipidemia type  CK    NMR LipoProfile   3. Benign essential hypertension     4. Secondary hypothyroidism  TSH    T4, Free    T3, Free   5. Vitamin D deficiency  Vitamin D 25 Hydroxy   6. Family history of colonic polyps     7. History of colon polyps, 5/17/2019--tubular adenoma x2     8. Therapeutic drug monitoring     9. Routine physical examination  CBC (No Diff)    CK    Comprehensive Metabolic Panel    Hemoglobin A1c    NMR LipoProfile    Vitamin D 25 Hydroxy    Urinalysis With Microscopic If Indicated (No Culture) - Urine, " Clean Catch    TSH    T4, Free    T3, Free    PSA DIAGNOSTIC     Patient has very mild impaired fasting glucose that does not require medication.  Hyperlipidemia is under fair control in about the best that we can get it given the fact that he is on Crestor 40 mg/day along with Zetia 10 mg/day.  He has secondary hypothyroidism and asked why his TSH is suppressed but his free T3 and free T4 are normal.  Vitamin D in therapeutic range.  Patient has new diagnosis of colon polyps and needs a repeat colonoscopy in 3 years according to the endoscopist.    Plan is as follows: No change in current medical regimen.  Patient will follow-up in 6 months with lab prior for his annual physical.  I recommend the patient check with his insurance company regarding the coverage of the new Shingrix vaccine here in the office.        Procedures

## 2019-11-05 LAB
GAMMA INTERFERON BACKGROUND BLD IA-ACNC: 0.04 IU/ML
M TB IFN-G BLD-IMP: POSITIVE
M TB IFN-G CD4+ BCKGRND COR BLD-ACNC: 1.71 IU/ML
MITOGEN IGNF BLD-ACNC: >10 IU/ML
QUANTIFERON INCUBATION: ABNORMAL
QUANTIFERON TB2 AG VALUE: 0.96 IU/ML
SERVICE CMNT-IMP: ABNORMAL

## 2020-05-07 ENCOUNTER — APPOINTMENT (OUTPATIENT)
Dept: LAB | Facility: HOSPITAL | Age: 50
End: 2020-05-07

## 2020-05-07 LAB
25(OH)D3 SERPL-MCNC: 23.6 NG/ML (ref 30–100)
ALBUMIN SERPL-MCNC: 4.1 G/DL (ref 3.5–5.2)
ALBUMIN/GLOB SERPL: 1.5 G/DL
ALP SERPL-CCNC: 79 U/L (ref 39–117)
ALT SERPL W P-5'-P-CCNC: 20 U/L (ref 1–41)
ANION GAP SERPL CALCULATED.3IONS-SCNC: 8.8 MMOL/L (ref 5–15)
AST SERPL-CCNC: 14 U/L (ref 1–40)
BACTERIA UR QL AUTO: ABNORMAL /HPF
BILIRUB SERPL-MCNC: 0.5 MG/DL (ref 0.2–1.2)
BILIRUB UR QL STRIP: NEGATIVE
BUN BLD-MCNC: 17 MG/DL (ref 6–20)
BUN/CREAT SERPL: 17.5 (ref 7–25)
CALCIUM SPEC-SCNC: 9.1 MG/DL (ref 8.6–10.5)
CHLORIDE SERPL-SCNC: 104 MMOL/L (ref 98–107)
CK SERPL-CCNC: 81 U/L (ref 20–200)
CLARITY UR: CLEAR
CO2 SERPL-SCNC: 28.2 MMOL/L (ref 22–29)
COLOR UR: YELLOW
CREAT BLD-MCNC: 0.97 MG/DL (ref 0.76–1.27)
DEPRECATED RDW RBC AUTO: 39 FL (ref 37–54)
ERYTHROCYTE [DISTWIDTH] IN BLOOD BY AUTOMATED COUNT: 12.4 % (ref 12.3–15.4)
GFR SERPL CREATININE-BSD FRML MDRD: 82 ML/MIN/1.73
GFR SERPL CREATININE-BSD FRML MDRD: 99 ML/MIN/1.73
GLOBULIN UR ELPH-MCNC: 2.7 GM/DL
GLUCOSE BLD-MCNC: 125 MG/DL (ref 65–99)
GLUCOSE UR STRIP-MCNC: NEGATIVE MG/DL
HBA1C MFR BLD: 5.96 % (ref 4.8–5.6)
HCT VFR BLD AUTO: 41.2 % (ref 37.5–51)
HGB BLD-MCNC: 13.7 G/DL (ref 13–17.7)
HGB UR QL STRIP.AUTO: ABNORMAL
HYALINE CASTS UR QL AUTO: ABNORMAL /LPF
KETONES UR QL STRIP: NEGATIVE
LEUKOCYTE ESTERASE UR QL STRIP.AUTO: NEGATIVE
MCH RBC QN AUTO: 28.6 PG (ref 26.6–33)
MCHC RBC AUTO-ENTMCNC: 33.3 G/DL (ref 31.5–35.7)
MCV RBC AUTO: 86 FL (ref 79–97)
NITRITE UR QL STRIP: NEGATIVE
PH UR STRIP.AUTO: 6.5 [PH] (ref 5–8)
PLATELET # BLD AUTO: 216 10*3/MM3 (ref 140–450)
PMV BLD AUTO: 11.7 FL (ref 6–12)
POTASSIUM BLD-SCNC: 4.5 MMOL/L (ref 3.5–5.2)
PROT SERPL-MCNC: 6.8 G/DL (ref 6–8.5)
PROT UR QL STRIP: NEGATIVE
PSA SERPL-MCNC: 0.71 NG/ML (ref 0–4)
RBC # BLD AUTO: 4.79 10*6/MM3 (ref 4.14–5.8)
RBC # UR: ABNORMAL /HPF
REF LAB TEST METHOD: ABNORMAL
SODIUM BLD-SCNC: 141 MMOL/L (ref 136–145)
SP GR UR STRIP: 1.02 (ref 1–1.03)
SQUAMOUS #/AREA URNS HPF: ABNORMAL /HPF
T3FREE SERPL-MCNC: 2.92 PG/ML (ref 2–4.4)
T4 FREE SERPL-MCNC: 1.59 NG/DL (ref 0.93–1.7)
TSH SERPL DL<=0.05 MIU/L-ACNC: 0.54 UIU/ML (ref 0.27–4.2)
UROBILINOGEN UR QL STRIP: ABNORMAL
WBC NRBC COR # BLD: 4.76 10*3/MM3 (ref 3.4–10.8)
WBC UR QL AUTO: ABNORMAL /HPF

## 2020-05-07 PROCEDURE — 80053 COMPREHEN METABOLIC PANEL: CPT | Performed by: INTERNAL MEDICINE

## 2020-05-07 PROCEDURE — 81001 URINALYSIS AUTO W/SCOPE: CPT | Performed by: INTERNAL MEDICINE

## 2020-05-07 PROCEDURE — 82306 VITAMIN D 25 HYDROXY: CPT | Performed by: INTERNAL MEDICINE

## 2020-05-07 PROCEDURE — 80061 LIPID PANEL: CPT | Performed by: INTERNAL MEDICINE

## 2020-05-07 PROCEDURE — 84481 FREE ASSAY (FT-3): CPT | Performed by: INTERNAL MEDICINE

## 2020-05-07 PROCEDURE — 83036 HEMOGLOBIN GLYCOSYLATED A1C: CPT | Performed by: INTERNAL MEDICINE

## 2020-05-07 PROCEDURE — 83704 LIPOPROTEIN BLD QUAN PART: CPT | Performed by: INTERNAL MEDICINE

## 2020-05-07 PROCEDURE — 84153 ASSAY OF PSA TOTAL: CPT | Performed by: INTERNAL MEDICINE

## 2020-05-07 PROCEDURE — 36415 COLL VENOUS BLD VENIPUNCTURE: CPT

## 2020-05-07 PROCEDURE — 82550 ASSAY OF CK (CPK): CPT | Performed by: INTERNAL MEDICINE

## 2020-05-07 PROCEDURE — 84443 ASSAY THYROID STIM HORMONE: CPT | Performed by: INTERNAL MEDICINE

## 2020-05-07 PROCEDURE — 84439 ASSAY OF FREE THYROXINE: CPT | Performed by: INTERNAL MEDICINE

## 2020-05-07 PROCEDURE — 85027 COMPLETE CBC AUTOMATED: CPT | Performed by: INTERNAL MEDICINE

## 2020-05-08 LAB
CHOLEST SERPL-MCNC: 185 MG/DL (ref 100–199)
HDL SERPL-SCNC: 29.7 UMOL/L
HDLC SERPL-MCNC: 45 MG/DL
LDL-P: 1471 NMOL/L
LDLC REAL SIZE PAT SERPL: 21 NM
LDLC SERPL CALC-MCNC: 125 MG/DL (ref 0–99)
SMALL LDL-P: 444 NMOL/L
TRIGL SERPL-MCNC: 77 MG/DL (ref 0–149)

## 2020-05-11 ENCOUNTER — OFFICE VISIT (OUTPATIENT)
Dept: INTERNAL MEDICINE | Facility: CLINIC | Age: 50
End: 2020-05-11

## 2020-05-11 VITALS
HEIGHT: 64 IN | SYSTOLIC BLOOD PRESSURE: 120 MMHG | WEIGHT: 159.6 LBS | OXYGEN SATURATION: 100 % | DIASTOLIC BLOOD PRESSURE: 70 MMHG | HEART RATE: 76 BPM | BODY MASS INDEX: 27.25 KG/M2

## 2020-05-11 DIAGNOSIS — Z86.010 HISTORY OF COLON POLYPS: Chronic | ICD-10-CM

## 2020-05-11 DIAGNOSIS — Z51.81 THERAPEUTIC DRUG MONITORING: ICD-10-CM

## 2020-05-11 DIAGNOSIS — Z92.89 HISTORY OF POSITIVE PPD: Chronic | ICD-10-CM

## 2020-05-11 DIAGNOSIS — E55.9 VITAMIN D DEFICIENCY: Chronic | ICD-10-CM

## 2020-05-11 DIAGNOSIS — R73.01 IMPAIRED FASTING GLUCOSE: Chronic | ICD-10-CM

## 2020-05-11 DIAGNOSIS — Z00.00 ROUTINE PHYSICAL EXAMINATION: Primary | ICD-10-CM

## 2020-05-11 DIAGNOSIS — B35.4 TINEA CORPORIS: ICD-10-CM

## 2020-05-11 DIAGNOSIS — I10 BENIGN ESSENTIAL HYPERTENSION: Chronic | ICD-10-CM

## 2020-05-11 DIAGNOSIS — E78.2 MIXED HYPERLIPIDEMIA: Chronic | ICD-10-CM

## 2020-05-11 DIAGNOSIS — R31.29 MICROSCOPIC HEMATURIA: ICD-10-CM

## 2020-05-11 DIAGNOSIS — Z83.71 FAMILY HISTORY OF COLONIC POLYPS: Chronic | ICD-10-CM

## 2020-05-11 DIAGNOSIS — E78.5 HYPERLIPIDEMIA, UNSPECIFIED HYPERLIPIDEMIA TYPE: ICD-10-CM

## 2020-05-11 DIAGNOSIS — E03.8 SECONDARY HYPOTHYROIDISM: Chronic | ICD-10-CM

## 2020-05-11 PROCEDURE — 99396 PREV VISIT EST AGE 40-64: CPT | Performed by: INTERNAL MEDICINE

## 2020-05-11 RX ORDER — EZETIMIBE 10 MG/1
TABLET ORAL
Qty: 90 TABLET | Refills: 3 | Status: SHIPPED | OUTPATIENT
Start: 2020-05-11 | End: 2021-06-11 | Stop reason: SDUPTHER

## 2020-05-11 RX ORDER — ROSUVASTATIN CALCIUM 40 MG/1
TABLET, COATED ORAL
Qty: 90 TABLET | Refills: 3 | Status: SHIPPED | OUTPATIENT
Start: 2020-05-11 | End: 2021-06-11 | Stop reason: SDUPTHER

## 2020-05-11 RX ORDER — LEVOTHYROXINE SODIUM 0.07 MG/1
TABLET ORAL
Qty: 90 TABLET | Refills: 3 | Status: SHIPPED | OUTPATIENT
Start: 2020-05-11 | End: 2021-05-21

## 2020-05-11 RX ORDER — CLOTRIMAZOLE AND BETAMETHASONE DIPROPIONATE 10; .64 MG/G; MG/G
CREAM TOPICAL
Qty: 15 G | Refills: 1 | Status: SHIPPED | OUTPATIENT
Start: 2020-05-11 | End: 2020-12-07

## 2020-05-11 RX ORDER — IRBESARTAN 150 MG/1
TABLET ORAL
Qty: 90 TABLET | Refills: 3 | Status: SHIPPED | OUTPATIENT
Start: 2020-05-11 | End: 2021-06-11 | Stop reason: SDUPTHER

## 2020-05-11 NOTE — PROGRESS NOTES
05/11/2020    Patient Information  Gustavo Mcclendon                                                                                          [id number] [facility] [id number]      1970  [unfilled]  There is no work phone number on file.    Chief Complaint:       History of Present Illness:      Review of Systems   Constitution: Negative.   HENT: Negative.    Eyes: Negative.    Cardiovascular: Negative.    Respiratory: Negative.    Endocrine: Negative.    Hematologic/Lymphatic: Negative.    Skin: Positive for itching and rash.   Musculoskeletal: Negative.    Gastrointestinal: Negative.    Genitourinary: Negative.    Neurological: Negative.    Psychiatric/Behavioral: Negative.    Allergic/Immunologic: Negative.        Active Problems:    Patient Active Problem List   Diagnosis   • Benign essential hypertension   • Hyperlipidemia   • Impaired fasting glucose   • Secondary hypothyroidism   • Vitamin D deficiency   • Therapeutic drug monitoring   • Routine physical examination   • ACE-inhibitor cough   • Family history of colonic polyps   • History of colon polyps, 5/17/2019--tubular adenoma x2   • History of positive PPD   • Tinea corporis   • Microscopic hematuria         Past Medical History:   Diagnosis Date   • ACE-inhibitor cough 4/22/2016 04/22/2016--patient presents with a several month history of a dry cough related to the use of lisinopril.   • Benign essential hypertension 5/14/2009 05/14/2009--treatment for hypertension begun.   • Family history of colonic polyps 2/9/2018    Patient's paternal grandfather and paternal uncle had a history of colon polyps.   • History of colon polyps, 5/17/2019--tubular adenoma x2 11/1/2019    May 17, 2019--colonoscopy revealed a cecal polyp and a splenic flexure polyp.  Pathology returned tubular adenoma x2.   • History of positive PPD 11/1/2019   • Hyperlipidemia 1/1/1997 1997--treatment for hyperlipidemia begun.   • Impaired fasting  glucose 2/18/2011 04/22/2011--initial hemoglobin A1c 6.3.  02/18/2011--initial diagnosis of impaired fasting glucose with a serum glucose of 111.   • Secondary hypothyroidism 5/31/2013 04/25/2014--correction: patient did have a contrasted MRI of the pituitary 08/19/2013 and it did not show any evidence of pituitary tumor.  04/19/2014--TSH is low at 0.363, normal range 0.45--4.5. T4 borderline at 4.5, normal range 4.5--12.0. Free thyroxine index normal at 1.7, normal range 1.2--4.9. Patient reports he has symptoms of fatigue as well as cold intolerance and constipation and would like to try supplementation. Levothyroxine 75 mcg per day initiated. Followup in 5 weeks with lab prior.   10/21/2013--patient was seen in consultation by the endocrinologist and repeat thyroid function tests were performed. 3 T3 was normal at 3.1. Thyroid antibodies were normal. TSH was low normal range at 0.452. Free T4 was normal at 1.36, normal range 0.8 to--1.77. Patient was felt to be euthyroid and only needed continued observation.   05/31/2013--TSH was low at 0.390. Question secondary hypothyroid. Repeat thyroid function tests revealed TSH to be normal at 0.46, normal range 0.27--4.20. T3 was low at 78.   • Vitamin D deficiency 3/28/2016         Past Surgical History:   Procedure Laterality Date   • COLONOSCOPY  05/17/2019    May 17, 2019--colonoscopy revealed a cecal polyp and a splenic flexure polyp.  Pathology returned tubular adenoma x2.   • DENTAL PROCEDURE  2012 2012--dental implant.         No Known Allergies        Current Outpatient Medications:   •  aspirin 81 MG tablet, Take 1 tablet by mouth daily., Disp: , Rfl:   •  Cholecalciferol (VITAMIN D3) 5000 UNITS capsule capsule, Take 1 capsule by mouth daily., Disp: , Rfl:   •  ezetimibe (ZETIA) 10 MG tablet, 1 p.o. daily for high cholesterol, Disp: 90 tablet, Rfl: 3  •  irbesartan (AVAPRO) 150 MG tablet, 1 p.o. daily for high blood pressure, Disp: 90 tablet, Rfl: 3  •  " levothyroxine (SYNTHROID, LEVOTHROID) 75 MCG tablet, 1 p.o. daily as directed for low thyroid, Disp: 90 tablet, Rfl: 3  •  rosuvastatin (CRESTOR) 40 MG tablet, 1 by mouth daily for cholesterol, Disp: 90 tablet, Rfl: 3  •  clotrimazole-betamethasone (LOTRISONE) 1-0.05 % cream, Apply as directed 2-3 times daily to the affected area, Disp: 15 g, Rfl: 1      Family History   Problem Relation Age of Onset   • Coronary artery disease Mother    • Diabetes Mother    • Hypertension Brother          Social History     Socioeconomic History   • Marital status:      Spouse name: jose   • Number of children: Not on file   • Years of education: Not on file   • Highest education level: Professional school degree (e.g., MD, DDS, DVM, RICARDA)   Occupational History   • Occupation: Psychiatrist - [employer]   Social Needs   • Financial resource strain: Not hard at all   • Food insecurity:     Worry: Never true     Inability: Never true   • Transportation needs:     Medical: No     Non-medical: No   Tobacco Use   • Smoking status: Never Smoker   • Smokeless tobacco: Never Used   Substance and Sexual Activity   • Alcohol use: No   • Drug use: No   • Sexual activity: Yes     Partners: Female   Lifestyle   • Physical activity:     Days per week: 2 days     Minutes per session: 30 min   • Stress: Not at all   Relationships   • Social connections:     Talks on phone: Patient refused     Gets together: Patient refused     Attends Zoroastrianism service: Patient refused     Active member of club or organization: Patient refused     Attends meetings of clubs or organizations: Patient refused     Relationship status: Patient refused         Vitals:    05/11/20 1500   BP: 120/70   BP Location: Left arm   Pulse: 76   SpO2: 100%   Weight: 72.4 kg (159 lb 9.6 oz)   Height: 162.6 cm (64.02\")        Body mass index is 27.38 kg/m².      Physical Exam:    General: Alert and oriented x 3.  No acute distress.  Normal affect.  HEENT: Pupils equal, " round, reactive to light; extraocular movements intact; sclerae nonicteric; pharynx, ear canals and TMs normal.  Neck: Without JVD, thyromegaly, bruit, or adenopathy.  Lungs: Clear to auscultation in all fields.  Heart: Regular rate and rhythm without murmur, rub, gallop, or click.  Abdomen: Soft, nontender, without hepatosplenomegaly or hernia.  Bowel sounds normal.  : Deferred.  Rectal: Deferred.  Extremities: Without clubbing, cyanosis, edema, or pulse deficit.  Neurologic: Intact without focal deficit.  Normal station and gait observed during ingress and egress from the examination room.  Skin: Patient has a small annular patch of a rash that is approximately an inch to an inch and a half in diameter and has lacy borders with slight erythema that is located anterior ankle/foot region.  Musculoskeletal: Unremarkable.    Lab/other results:    NMR reveals a total cholesterol of 185.  Triglycerides are normal at 77.  LDL particle number is borderline at 1471.  Small LDL particle number excellent at 444.  HDL particle numbers a little low at 29.7.  Hemoglobin A1c 5.96.  CMP normal except glucose 125.  CBC is normal.  CPK normal.  Urinalysis normal except 6-12 RBCs.  PSA normal at 0.715.  Thyroid function tests are normal.    Assessment/Plan:     Diagnosis Plan   1. Routine physical examination     2. Impaired fasting glucose     3. Hyperlipidemia     4. Benign essential hypertension     5. Secondary hypothyroidism     6. Vitamin D deficiency     7. Family history of colonic polyps     8. History of colon polyps, 5/17/2019--tubular adenoma x2     9. History of positive PPD     10. Therapeutic drug monitoring     11. Tinea corporis  clotrimazole-betamethasone (LOTRISONE) 1-0.05 % cream   12. Microscopic hematuria       Patient presents with essentially normal annual physical except for the following issues: He has mild impaired fasting glucose that does not require medication.  Low carbohydrate diet discussed.   Hyperlipidemia is under the best control it we can get it and it is actually fairly good.  Blood pressure is well controlled.  Secondary hypothyroidism is well controlled.  Vitamin D is therapeutic.  Patient has a history of colon polyps and a family history of colon polyps and is up-to-date on his colonoscopy.  He has a history of positive PPD and is up-to-date on his QuantiFERON test.  He has no symptoms.  He has complaints of a rash on his ankle/foot as described and I think this is consistent with tenia.  Apparent new diagnosis of microscopic hematuria needs further evaluation.  Patient reports he did have a very remote history of kidney stones.  Currently no symptoms.    Several preventative health issues discussed including review of vaccinations and recommendations, including dietary issues, exercise and weight loss.  Safe sex practices discussed.  Patient advised to wear seatbelt whenever driving and avoid texting and driving.  Also advised to look both ways before crossing the street.  Colon cancer prevention discussed and is up-to-date with colonoscopy.  Advised to avoid tobacco products and minimize alcohol consumption.    Plan is as follows: No changes in current medical regimen except prescription for antifungal/topical steroid cream.  Urology referral to evaluate microscopic hematuria.  Patient will continue to work on diet and maintain ideal body weight.  I will have him follow-up in 6 months with lab prior or follow-up as needed.        Procedures

## 2020-06-26 ENCOUNTER — HOSPITAL ENCOUNTER (OUTPATIENT)
Dept: URGENT CARE | Facility: CLINIC | Age: 50
Discharge: HOME | End: 2020-06-26
Attending: PSYCHIATRY & NEUROLOGY

## 2020-06-27 LAB — SARS-COV-2 RNA SPEC QL NAA+PROBE: NOT DETECTED

## 2020-06-29 ENCOUNTER — OFFICE VISIT (OUTPATIENT)
Dept: INTERNAL MEDICINE | Facility: CLINIC | Age: 50
End: 2020-06-29

## 2020-06-29 ENCOUNTER — TELEPHONE (OUTPATIENT)
Dept: INTERNAL MEDICINE | Facility: CLINIC | Age: 50
End: 2020-06-29

## 2020-06-29 DIAGNOSIS — Z20.822 CLOSE EXPOSURE TO COVID-19 VIRUS: Primary | ICD-10-CM

## 2020-06-29 PROCEDURE — 99213 OFFICE O/P EST LOW 20 MIN: CPT | Performed by: INTERNAL MEDICINE

## 2020-06-29 NOTE — PROGRESS NOTES
"             06/29/2020    Patient Information  Gustavo Mcclendon                                                                                          [id number] [facility] [address]      1970  [unfilled]  There is no work phone number on file.    Chief Complaint:     \"I have been exposed to COVID-19 and need to be tested.\"    History of Present Illness:    Telephone visit.  Patient consents to this visit and understands the limitations.  Actively participated.  15 minutes was spent discussing the situation per telephone.    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  Patient tells me that he cannot come back to the hospital to practice until he has been tested for Covid-19.  He currently is asymptomatic and specifically has no fever, chills, cough, change in taste, or any other symptoms related to Covid-19 infection.    Review of Systems   Constitution: Negative for chills, fever and malaise/fatigue.   HENT: Negative.         No change in taste or smell   Eyes: Negative.    Cardiovascular: Negative.    Respiratory: Negative.  Negative for cough, shortness of breath, sputum production and wheezing.    Endocrine: Negative.    Hematologic/Lymphatic: Negative.    Skin: Negative.  Negative for rash.   Musculoskeletal: Negative.    Gastrointestinal: Negative.    Genitourinary: Negative.    Neurological: Negative.    Psychiatric/Behavioral: Negative.    Allergic/Immunologic: Negative.        Active Problems:    Patient Active Problem List   Diagnosis   • Benign essential hypertension   • Hyperlipidemia   • Impaired fasting glucose   • Secondary hypothyroidism   • Vitamin D deficiency   • Therapeutic drug monitoring   • Routine physical examination   • ACE-inhibitor cough   • Family history of colonic polyps   • History of colon polyps, 5/17/2019--tubular adenoma x2   • History of positive PPD   • Tinea " corporis   • Microscopic hematuria   • Close exposure to COVID-19 virus         Past Medical History:   Diagnosis Date   • ACE-inhibitor cough 4/22/2016 04/22/2016--patient presents with a several month history of a dry cough related to the use of lisinopril.   • Benign essential hypertension 5/14/2009 05/14/2009--treatment for hypertension begun.   • Family history of colonic polyps 2/9/2018    Patient's paternal grandfather and paternal uncle had a history of colon polyps.   • History of colon polyps, 5/17/2019--tubular adenoma x2 11/1/2019    May 17, 2019--colonoscopy revealed a cecal polyp and a splenic flexure polyp.  Pathology returned tubular adenoma x2.   • History of positive PPD 11/1/2019   • Hyperlipidemia 1/1/1997 1997--treatment for hyperlipidemia begun.   • Impaired fasting glucose 2/18/2011 04/22/2011--initial hemoglobin A1c 6.3.  02/18/2011--initial diagnosis of impaired fasting glucose with a serum glucose of 111.   • Secondary hypothyroidism 5/31/2013 04/25/2014--correction: patient did have a contrasted MRI of the pituitary 08/19/2013 and it did not show any evidence of pituitary tumor.  04/19/2014--TSH is low at 0.363, normal range 0.45--4.5. T4 borderline at 4.5, normal range 4.5--12.0. Free thyroxine index normal at 1.7, normal range 1.2--4.9. Patient reports he has symptoms of fatigue as well as cold intolerance and constipation and would like to try supplementation. Levothyroxine 75 mcg per day initiated. Followup in 5 weeks with lab prior.   10/21/2013--patient was seen in consultation by the endocrinologist and repeat thyroid function tests were performed. 3 T3 was normal at 3.1. Thyroid antibodies were normal. TSH was low normal range at 0.452. Free T4 was normal at 1.36, normal range 0.8 to--1.77. Patient was felt to be euthyroid and only needed continued observation.   05/31/2013--TSH was low at 0.390. Question secondary hypothyroid. Repeat thyroid function tests revealed  TSH to be normal at 0.46, normal range 0.27--4.20. T3 was low at 78.   • Vitamin D deficiency 3/28/2016         Past Surgical History:   Procedure Laterality Date   • COLONOSCOPY  05/17/2019    May 17, 2019--colonoscopy revealed a cecal polyp and a splenic flexure polyp.  Pathology returned tubular adenoma x2.   • DENTAL PROCEDURE  2012 2012--dental implant.         No Known Allergies        Current Outpatient Medications:   •  aspirin 81 MG tablet, Take 1 tablet by mouth daily., Disp: , Rfl:   •  Cholecalciferol (VITAMIN D3) 5000 UNITS capsule capsule, Take 1 capsule by mouth daily., Disp: , Rfl:   •  clotrimazole-betamethasone (LOTRISONE) 1-0.05 % cream, Apply as directed 2-3 times daily to the affected area, Disp: 15 g, Rfl: 1  •  ezetimibe (ZETIA) 10 MG tablet, 1 p.o. daily for high cholesterol, Disp: 90 tablet, Rfl: 3  •  irbesartan (AVAPRO) 150 MG tablet, 1 p.o. daily for high blood pressure, Disp: 90 tablet, Rfl: 3  •  levothyroxine (SYNTHROID, LEVOTHROID) 75 MCG tablet, 1 p.o. 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[employer]   Social Needs   • Financial resource strain: Not hard at all   • Food insecurity:     Worry: Never true     Inability: Never true   • Transportation needs:     Medical: No     Non-medical: No   Tobacco Use   • Smoking status: Never Smoker   • Smokeless tobacco: Never Used   Substance and Sexual Activity   • Alcohol use: No   •  Drug use: No   • Sexual activity: Yes     Partners: Female   Lifestyle   • Physical activity:     Days per week: 2 days     Minutes per session: 30 min   • Stress: Not at all   Relationships   • Social connections:     Talks on phone: Patient refused     Gets together: Patient refused     Attends Protestant service: Patient refused     Active member of club or organization: Patient refused     Attends meetings of clubs or organizations: Patient refused     Relationship status: Patient refused         There were no vitals filed for this visit.     There is no height or weight on file to calculate BMI.      Physical Exam:    Telephone visit and therefore vital signs and physical exam not possible.    Lab/other results:      Assessment/Plan:     Diagnosis Plan   1. 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  I instructed patient he needs to report to the urgent care where they can perform the testing swab and I have placed an order for the test to be done.  Patient can follow-up on the phone for the results and possible further instructions.  Patient should remain quarantine until the results are known and also at the direction of the administration of the hospital that he practices them.  This is not a [employer] system.            Procedures

## 2020-11-24 ENCOUNTER — LAB (OUTPATIENT)
Dept: LAB | Facility: HOSPITAL | Age: 50
End: 2020-11-24

## 2020-11-24 LAB
ALBUMIN SERPL-MCNC: 4 G/DL (ref 3.5–5.2)
ALBUMIN/GLOB SERPL: 1.7 G/DL
ALP SERPL-CCNC: 79 U/L (ref 39–117)
ALT SERPL W P-5'-P-CCNC: 20 U/L (ref 1–41)
ANION GAP SERPL CALCULATED.3IONS-SCNC: 6.1 MMOL/L (ref 5–15)
AST SERPL-CCNC: 15 U/L (ref 1–40)
BILIRUB SERPL-MCNC: 0.3 MG/DL (ref 0–1.2)
BUN SERPL-MCNC: 19 MG/DL (ref 6–20)
BUN/CREAT SERPL: 20.7 (ref 7–25)
CALCIUM SPEC-SCNC: 8.8 MG/DL (ref 8.6–10.5)
CHLORIDE SERPL-SCNC: 106 MMOL/L (ref 98–107)
CK SERPL-CCNC: 85 U/L (ref 20–200)
CO2 SERPL-SCNC: 27.9 MMOL/L (ref 22–29)
CREAT SERPL-MCNC: 0.92 MG/DL (ref 0.76–1.27)
DEPRECATED RDW RBC AUTO: 37.8 FL (ref 37–54)
ERYTHROCYTE [DISTWIDTH] IN BLOOD BY AUTOMATED COUNT: 11.9 % (ref 12.3–15.4)
GFR SERPL CREATININE-BSD FRML MDRD: 105 ML/MIN/1.73
GFR SERPL CREATININE-BSD FRML MDRD: 87 ML/MIN/1.73
GLOBULIN UR ELPH-MCNC: 2.3 GM/DL
GLUCOSE SERPL-MCNC: 107 MG/DL (ref 65–99)
HBA1C MFR BLD: 5.9 % (ref 4.8–5.6)
HCT VFR BLD AUTO: 41.3 % (ref 37.5–51)
HGB BLD-MCNC: 13.4 G/DL (ref 13–17.7)
MCH RBC QN AUTO: 28.3 PG (ref 26.6–33)
MCHC RBC AUTO-ENTMCNC: 32.4 G/DL (ref 31.5–35.7)
MCV RBC AUTO: 87.1 FL (ref 79–97)
PLATELET # BLD AUTO: 206 10*3/MM3 (ref 140–450)
PMV BLD AUTO: 11.6 FL (ref 6–12)
POTASSIUM SERPL-SCNC: 4.5 MMOL/L (ref 3.5–5.2)
PROT SERPL-MCNC: 6.3 G/DL (ref 6–8.5)
RBC # BLD AUTO: 4.74 10*6/MM3 (ref 4.14–5.8)
SODIUM SERPL-SCNC: 140 MMOL/L (ref 136–145)
T3FREE SERPL-MCNC: 2.9 PG/ML (ref 2–4.4)
T4 FREE SERPL-MCNC: 1.53 NG/DL (ref 0.93–1.7)
TSH SERPL DL<=0.05 MIU/L-ACNC: 0.27 UIU/ML (ref 0.27–4.2)
WBC # BLD AUTO: 5.03 10*3/MM3 (ref 3.4–10.8)

## 2020-11-24 PROCEDURE — 83036 HEMOGLOBIN GLYCOSYLATED A1C: CPT | Performed by: INTERNAL MEDICINE

## 2020-11-24 PROCEDURE — 80053 COMPREHEN METABOLIC PANEL: CPT | Performed by: INTERNAL MEDICINE

## 2020-11-24 PROCEDURE — 84443 ASSAY THYROID STIM HORMONE: CPT | Performed by: INTERNAL MEDICINE

## 2020-11-24 PROCEDURE — 85027 COMPLETE CBC AUTOMATED: CPT | Performed by: INTERNAL MEDICINE

## 2020-11-24 PROCEDURE — 83704 LIPOPROTEIN BLD QUAN PART: CPT | Performed by: INTERNAL MEDICINE

## 2020-11-24 PROCEDURE — 80061 LIPID PANEL: CPT | Performed by: INTERNAL MEDICINE

## 2020-11-24 PROCEDURE — 36415 COLL VENOUS BLD VENIPUNCTURE: CPT | Performed by: INTERNAL MEDICINE

## 2020-11-24 PROCEDURE — 84439 ASSAY OF FREE THYROXINE: CPT | Performed by: INTERNAL MEDICINE

## 2020-11-24 PROCEDURE — 82550 ASSAY OF CK (CPK): CPT | Performed by: INTERNAL MEDICINE

## 2020-11-24 PROCEDURE — 84481 FREE ASSAY (FT-3): CPT | Performed by: INTERNAL MEDICINE

## 2020-11-26 LAB
CHOLEST SERPL-MCNC: 136 MG/DL (ref 100–199)
HDL SERPL-SCNC: 29.1 UMOL/L
HDLC SERPL-MCNC: 43 MG/DL
LDL SERPL QN: 20.2 NM
LDL SERPL-SCNC: 1335 NMOL/L
LDL SMALL SERPL-SCNC: 785 NMOL/L
LDLC SERPL CALC-MCNC: 81 MG/DL (ref 0–99)
TRIGL SERPL-MCNC: 58 MG/DL (ref 0–149)

## 2020-12-07 ENCOUNTER — OFFICE VISIT (OUTPATIENT)
Dept: INTERNAL MEDICINE | Facility: CLINIC | Age: 50
End: 2020-12-07

## 2020-12-07 VITALS
OXYGEN SATURATION: 99 % | HEIGHT: 64 IN | SYSTOLIC BLOOD PRESSURE: 120 MMHG | BODY MASS INDEX: 26.46 KG/M2 | WEIGHT: 155 LBS | DIASTOLIC BLOOD PRESSURE: 76 MMHG | HEART RATE: 67 BPM

## 2020-12-07 DIAGNOSIS — Z00.00 ROUTINE PHYSICAL EXAMINATION: ICD-10-CM

## 2020-12-07 DIAGNOSIS — Z51.81 THERAPEUTIC DRUG MONITORING: ICD-10-CM

## 2020-12-07 DIAGNOSIS — I10 BENIGN ESSENTIAL HYPERTENSION: Chronic | ICD-10-CM

## 2020-12-07 DIAGNOSIS — Z86.010 HISTORY OF COLON POLYPS: Chronic | ICD-10-CM

## 2020-12-07 DIAGNOSIS — E78.2 MIXED HYPERLIPIDEMIA: Chronic | ICD-10-CM

## 2020-12-07 DIAGNOSIS — E55.9 VITAMIN D DEFICIENCY: Chronic | ICD-10-CM

## 2020-12-07 DIAGNOSIS — R73.01 IMPAIRED FASTING GLUCOSE: Primary | Chronic | ICD-10-CM

## 2020-12-07 DIAGNOSIS — E03.8 SECONDARY HYPOTHYROIDISM: Chronic | ICD-10-CM

## 2020-12-07 DIAGNOSIS — R31.29 MICROSCOPIC HEMATURIA: ICD-10-CM

## 2020-12-07 PROBLEM — B35.4 TINEA CORPORIS: Status: RESOLVED | Noted: 2020-05-11 | Resolved: 2020-12-07

## 2020-12-07 PROBLEM — Z20.822 CLOSE EXPOSURE TO COVID-19 VIRUS: Status: RESOLVED | Noted: 2020-06-29 | Resolved: 2020-12-07

## 2020-12-07 PROCEDURE — 99214 OFFICE O/P EST MOD 30 MIN: CPT | Performed by: INTERNAL MEDICINE

## 2020-12-07 NOTE — PROGRESS NOTES
12/07/2020    Patient Information  Gustavo Mcclendon                                                                                          [id number] [facility] [address]      1970  [unfilled]  There is no work phone number on file.    Chief Complaint:     Follow-up lab work in order to monitor chronic medical issues listed in history of present illness.  No new acute complaints.    History of Present Illness:    Patient with a history of impaired fasting glucose, hyperlipidemia, hypertension, history of colon polyps.  He presents today to follow-up with lab prior in order to monitor his chronic medical issues.  His past medical history reviewed and updated were necessary including health maintenance parameters.  This reveals he is currently up-to-date or else accounted for with the exception of hepatitis C screening and Shingrix vaccine.  I advised patient to check with his insurance company to see if the Shingrix vaccine is covered here in the office.  I have highly recommended.    Review of Systems   Constitution: Negative.   HENT: Negative.    Eyes: Negative.    Cardiovascular: Negative.    Respiratory: Negative.    Endocrine: Negative.    Hematologic/Lymphatic: Negative.    Skin: Negative.    Musculoskeletal: Negative.    Gastrointestinal: Negative.    Genitourinary: Negative.    Neurological: Negative.    Psychiatric/Behavioral: Negative.    Allergic/Immunologic: Negative.        Active Problems:    Patient Active Problem List   Diagnosis   • Benign essential hypertension   • Hyperlipidemia   • Impaired fasting glucose   • Secondary hypothyroidism   • Vitamin D deficiency   • Therapeutic drug monitoring   • Routine physical examination   • ACE-inhibitor cough   • Family history of colonic polyps   • History of colon polyps, 5/17/2019--tubular adenoma x2   • History of positive PPD   • Microscopic hematuria         Past Medical History:   Diagnosis Date   • ACE-inhibitor cough  4/22/2016 04/22/2016--patient presents with a several month history of a dry cough related to the use of lisinopril.   • Benign essential hypertension 5/14/2009 05/14/2009--treatment for hypertension begun.   • Family history of colonic polyps 2/9/2018    Patient's paternal grandfather and paternal uncle had a history of colon polyps.   • History of colon polyps, 5/17/2019--tubular adenoma x2 11/1/2019    May 17, 2019--colonoscopy revealed a cecal polyp and a splenic flexure polyp.  Pathology returned tubular adenoma x2.   • History of positive PPD 11/1/2019   • Hyperlipidemia 1/1/1997 1997--treatment for hyperlipidemia begun.   • Impaired fasting glucose 2/18/2011 04/22/2011--initial hemoglobin A1c 6.3.  02/18/2011--initial diagnosis of impaired fasting glucose with a serum glucose of 111.   • Secondary hypothyroidism 5/31/2013 04/25/2014--correction: patient did have a contrasted MRI of the pituitary 08/19/2013 and it did not show any evidence of pituitary tumor.  04/19/2014--TSH is low at 0.363, normal range 0.45--4.5. T4 borderline at 4.5, normal range 4.5--12.0. Free thyroxine index normal at 1.7, normal range 1.2--4.9. Patient reports he has symptoms of fatigue as well as cold intolerance and constipation and would like to try supplementation. Levothyroxine 75 mcg per day initiated. Followup in 5 weeks with lab prior.   10/21/2013--patient was seen in consultation by the endocrinologist and repeat thyroid function tests were performed. 3 T3 was normal at 3.1. Thyroid antibodies were normal. TSH was low normal range at 0.452. Free T4 was normal at 1.36, normal range 0.8 to--1.77. Patient was felt to be euthyroid and only needed continued observation.   05/31/2013--TSH was low at 0.390. Question secondary hypothyroid. Repeat thyroid function tests revealed TSH to be normal at 0.46, normal range 0.27--4.20. T3 was low at 78.   • Vitamin D deficiency 3/28/2016         Past Surgical History:    Procedure Laterality Date   • COLONOSCOPY  05/17/2019    May 17, 2019--colonoscopy revealed a cecal polyp and a splenic flexure polyp.  Pathology returned tubular adenoma x2.   • DENTAL PROCEDURE  2012 2012--dental implant.         No Known Allergies        Current Outpatient Medications:   •  aspirin 81 MG tablet, Take 1 tablet by mouth daily., Disp: , Rfl:   •  Cholecalciferol (VITAMIN D3) 5000 UNITS capsule capsule, Take 1 capsule by mouth daily., Disp: , Rfl:   •  ezetimibe (ZETIA) 10 MG tablet, 1 p.o. daily for high cholesterol, Disp: 90 tablet, Rfl: 3  •  irbesartan (AVAPRO) 150 MG tablet, 1 p.o. daily for high blood pressure, Disp: 90 tablet, Rfl: 3  •  levothyroxine (SYNTHROID, LEVOTHROID) 75 MCG tablet, 1 p.o. 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[employer]   Social Needs   • Financial resource strain: Not hard at all   • Food insecurity     Worry: Never true     Inability: Never true   • Transportation needs     Medical: No     Non-medical: No   Tobacco Use   • Smoking status: Never Smoker   • Smokeless tobacco: Never Used   Substance and Sexual Activity   • Alcohol use: No   • Drug use: No   • Sexual activity: Yes     Partners: Female   Lifestyle   • Physical activity     Days per week: 2 days     Minutes per session: 30 min   • Stress: Not at all   Relationships   • Social connections     Talks on phone: Patient refused     Gets together:  "Patient refused     Attends Bahai service: Patient refused     Active member of club or organization: Patient refused     Attends meetings of clubs or organizations: Patient refused     Relationship status: Patient refused         Vitals:    12/07/20 1538   BP: 120/76   BP Location: Left arm   Patient Position: Sitting   Cuff Size: Adult   Pulse: 67   SpO2: 99%   Weight: 70.3 kg (155 lb)   Height: 162.6 cm (64.02\")        Body mass index is 26.59 kg/m².      Physical Exam:    General: Alert and oriented x 3.  No acute distress.  Normal affect.  HEENT: Pupils equal, round, reactive to light; extraocular movements intact; sclerae nonicteric; pharynx, ear canals and TMs normal.  Neck: Without JVD, thyromegaly, bruit, or adenopathy.  Lungs: Clear to auscultation in all fields.  Heart: Regular rate and rhythm without murmur, rub, gallop, or click.  Abdomen: Soft, nontender, without hepatosplenomegaly or hernia.  Bowel sounds normal.  : Deferred.  Rectal: Deferred.  Extremities: Without clubbing, cyanosis, edema, or pulse deficit.  Neurologic: Intact without focal deficit.  Normal station and gait observed during ingress and egress from the examination room.  Skin: Without significant lesion.  Musculoskeletal: Unremarkable.    Lab/other results:    NMR reveals a total cholesterol of 136.  Triglycerides normal at 58.  LDL particle number borderline at 1335.  HDL particle number low at 29.1.  CMP normal except glucose 107.  Hemoglobin A1c 5.9.  CPK normal.  CBC normal.  TSH is a little suppressed at 0.267.  However, free T3 and free T4 are normal.    Assessment/Plan:     Diagnosis Plan   1. Impaired fasting glucose  Comprehensive Metabolic Panel    Hemoglobin A1c   2. Hyperlipidemia  CK    Comprehensive Metabolic Panel    NMR LipoProfile   3. Benign essential hypertension     4. Secondary hypothyroidism  TSH    T4, Free    T3, Free   5. Vitamin D deficiency  Vitamin D 25 Hydroxy   6. History of colon polyps, " 5/17/2019--tubular adenoma x2     7. Therapeutic drug monitoring  CBC (No Diff)    PSA DIAGNOSTIC   8. Routine physical examination  CBC (No Diff)    CK    Comprehensive Metabolic Panel    Hemoglobin A1c    Hepatitis C Antibody    NMR LipoProfile    Vitamin D 25 Hydroxy    Urinalysis With Microscopic If Indicated (No Culture) - Urine, Clean Catch    TSH    T4, Free    T3, Free    PSA DIAGNOSTIC   9. Microscopic hematuria  Urinalysis With Microscopic If Indicated (No Culture) - Urine, Clean Catch     Patient has mild impaired fasting glucose that does not require medication.  Hyperlipidemia is under fairly good control.  His blood pressure appears to be controlled.  Patient has secondary hypothyroidism and his TSH is suppressed intrinsically.  His free T3 and free T4 are normal.  Vitamin D is in normal range.  Patient has a history of colon polyps and had a colonoscopy just last year.  We will continue to monitor microscopic hematuria.    Plan is as follows: No change in current medical regimen.  Patient will follow-up after May 12, 2021 with lab prior and this will be his annual.        Procedures

## 2021-03-30 ENCOUNTER — BULK ORDERING (OUTPATIENT)
Dept: CASE MANAGEMENT | Facility: OTHER | Age: 51
End: 2021-03-30

## 2021-03-30 DIAGNOSIS — Z23 IMMUNIZATION DUE: ICD-10-CM

## 2021-05-20 DIAGNOSIS — E03.8 SECONDARY HYPOTHYROIDISM: Chronic | ICD-10-CM

## 2021-05-21 RX ORDER — LEVOTHYROXINE SODIUM 0.07 MG/1
TABLET ORAL
Qty: 90 TABLET | Refills: 3 | Status: SHIPPED | OUTPATIENT
Start: 2021-05-21 | End: 2021-06-11 | Stop reason: SDUPTHER

## 2021-06-08 ENCOUNTER — LAB (OUTPATIENT)
Dept: LAB | Facility: HOSPITAL | Age: 51
End: 2021-06-08

## 2021-06-08 DIAGNOSIS — E03.8 SECONDARY HYPOTHYROIDISM: Chronic | ICD-10-CM

## 2021-06-08 DIAGNOSIS — R73.01 IMPAIRED FASTING GLUCOSE: Chronic | ICD-10-CM

## 2021-06-08 DIAGNOSIS — E55.9 VITAMIN D DEFICIENCY: Chronic | ICD-10-CM

## 2021-06-08 DIAGNOSIS — Z51.81 THERAPEUTIC DRUG MONITORING: ICD-10-CM

## 2021-06-08 DIAGNOSIS — R31.29 MICROSCOPIC HEMATURIA: ICD-10-CM

## 2021-06-08 DIAGNOSIS — E78.2 MIXED HYPERLIPIDEMIA: Chronic | ICD-10-CM

## 2021-06-08 DIAGNOSIS — Z00.00 ROUTINE PHYSICAL EXAMINATION: ICD-10-CM

## 2021-06-08 LAB
25(OH)D3 SERPL-MCNC: 38.4 NG/ML (ref 30–100)
ALBUMIN SERPL-MCNC: 4.1 G/DL (ref 3.5–5.2)
ALBUMIN/GLOB SERPL: 1.4 G/DL
ALP SERPL-CCNC: 88 U/L (ref 39–117)
ALT SERPL W P-5'-P-CCNC: 18 U/L (ref 1–41)
ANION GAP SERPL CALCULATED.3IONS-SCNC: 8.1 MMOL/L (ref 5–15)
AST SERPL-CCNC: 15 U/L (ref 1–40)
BACTERIA UR QL AUTO: ABNORMAL /HPF
BILIRUB SERPL-MCNC: 0.6 MG/DL (ref 0–1.2)
BILIRUB UR QL STRIP: NEGATIVE
BUN SERPL-MCNC: 16 MG/DL (ref 6–20)
BUN/CREAT SERPL: 16.2 (ref 7–25)
CALCIUM SPEC-SCNC: 9.1 MG/DL (ref 8.6–10.5)
CHLORIDE SERPL-SCNC: 106 MMOL/L (ref 98–107)
CK SERPL-CCNC: 104 U/L (ref 20–200)
CLARITY UR: CLEAR
CO2 SERPL-SCNC: 26.9 MMOL/L (ref 22–29)
COLOR UR: ABNORMAL
CREAT SERPL-MCNC: 0.99 MG/DL (ref 0.76–1.27)
DEPRECATED RDW RBC AUTO: 40 FL (ref 37–54)
ERYTHROCYTE [DISTWIDTH] IN BLOOD BY AUTOMATED COUNT: 12.4 % (ref 12.3–15.4)
GFR SERPL CREATININE-BSD FRML MDRD: 80 ML/MIN/1.73
GFR SERPL CREATININE-BSD FRML MDRD: 97 ML/MIN/1.73
GLOBULIN UR ELPH-MCNC: 2.9 GM/DL
GLUCOSE SERPL-MCNC: 104 MG/DL (ref 65–99)
GLUCOSE UR STRIP-MCNC: NEGATIVE MG/DL
HBA1C MFR BLD: 5.71 % (ref 4.8–5.6)
HCT VFR BLD AUTO: 46.4 % (ref 37.5–51)
HCV AB SER DONR QL: NORMAL
HGB BLD-MCNC: 14.9 G/DL (ref 13–17.7)
HGB UR QL STRIP.AUTO: ABNORMAL
HYALINE CASTS UR QL AUTO: ABNORMAL /LPF
KETONES UR QL STRIP: ABNORMAL
LEUKOCYTE ESTERASE UR QL STRIP.AUTO: NEGATIVE
MCH RBC QN AUTO: 28.4 PG (ref 26.6–33)
MCHC RBC AUTO-ENTMCNC: 32.1 G/DL (ref 31.5–35.7)
MCV RBC AUTO: 88.5 FL (ref 79–97)
NITRITE UR QL STRIP: NEGATIVE
PH UR STRIP.AUTO: 6 [PH] (ref 5–8)
PLATELET # BLD AUTO: 200 10*3/MM3 (ref 140–450)
PMV BLD AUTO: 11.7 FL (ref 6–12)
POTASSIUM SERPL-SCNC: 4.2 MMOL/L (ref 3.5–5.2)
PROT SERPL-MCNC: 7 G/DL (ref 6–8.5)
PROT UR QL STRIP: NEGATIVE
PSA SERPL-MCNC: 1.24 NG/ML (ref 0–4)
RBC # BLD AUTO: 5.24 10*6/MM3 (ref 4.14–5.8)
RBC # UR: ABNORMAL /HPF
REF LAB TEST METHOD: ABNORMAL
SODIUM SERPL-SCNC: 141 MMOL/L (ref 136–145)
SP GR UR STRIP: 1.03 (ref 1–1.03)
SQUAMOUS #/AREA URNS HPF: ABNORMAL /HPF
T3FREE SERPL-MCNC: 2.93 PG/ML (ref 2–4.4)
T4 FREE SERPL-MCNC: 1.57 NG/DL (ref 0.93–1.7)
TSH SERPL DL<=0.05 MIU/L-ACNC: 0.19 UIU/ML (ref 0.27–4.2)
UROBILINOGEN UR QL STRIP: ABNORMAL
WBC # BLD AUTO: 6.11 10*3/MM3 (ref 3.4–10.8)
WBC UR QL AUTO: ABNORMAL /HPF

## 2021-06-08 PROCEDURE — 80053 COMPREHEN METABOLIC PANEL: CPT

## 2021-06-08 PROCEDURE — 83704 LIPOPROTEIN BLD QUAN PART: CPT

## 2021-06-08 PROCEDURE — 84439 ASSAY OF FREE THYROXINE: CPT

## 2021-06-08 PROCEDURE — 83036 HEMOGLOBIN GLYCOSYLATED A1C: CPT

## 2021-06-08 PROCEDURE — 80061 LIPID PANEL: CPT

## 2021-06-08 PROCEDURE — 85027 COMPLETE CBC AUTOMATED: CPT

## 2021-06-08 PROCEDURE — 82550 ASSAY OF CK (CPK): CPT

## 2021-06-08 PROCEDURE — 82306 VITAMIN D 25 HYDROXY: CPT

## 2021-06-08 PROCEDURE — 86803 HEPATITIS C AB TEST: CPT

## 2021-06-08 PROCEDURE — 81001 URINALYSIS AUTO W/SCOPE: CPT

## 2021-06-08 PROCEDURE — 36415 COLL VENOUS BLD VENIPUNCTURE: CPT

## 2021-06-08 PROCEDURE — 84443 ASSAY THYROID STIM HORMONE: CPT

## 2021-06-08 PROCEDURE — 84481 FREE ASSAY (FT-3): CPT

## 2021-06-08 PROCEDURE — 84153 ASSAY OF PSA TOTAL: CPT

## 2021-06-10 LAB
CHOLEST SERPL-MCNC: 161 MG/DL (ref 100–199)
HDL SERPL-SCNC: 28.9 UMOL/L
HDLC SERPL-MCNC: 49 MG/DL
LDL SERPL QN: 20.4 NM
LDL SERPL-SCNC: 1457 NMOL/L
LDL SMALL SERPL-SCNC: 750 NMOL/L
LDLC SERPL CALC-MCNC: 100 MG/DL (ref 0–99)
TRIGL SERPL-MCNC: 63 MG/DL (ref 0–149)

## 2021-06-11 ENCOUNTER — OFFICE VISIT (OUTPATIENT)
Dept: INTERNAL MEDICINE | Facility: CLINIC | Age: 51
End: 2021-06-11

## 2021-06-11 VITALS
HEART RATE: 60 BPM | OXYGEN SATURATION: 100 % | HEIGHT: 64 IN | WEIGHT: 146.6 LBS | RESPIRATION RATE: 16 BRPM | DIASTOLIC BLOOD PRESSURE: 58 MMHG | SYSTOLIC BLOOD PRESSURE: 118 MMHG | BODY MASS INDEX: 25.03 KG/M2 | TEMPERATURE: 98 F

## 2021-06-11 DIAGNOSIS — Z86.010 HISTORY OF COLON POLYPS: Chronic | ICD-10-CM

## 2021-06-11 DIAGNOSIS — Z92.89 HISTORY OF POSITIVE PPD: Chronic | ICD-10-CM

## 2021-06-11 DIAGNOSIS — Z51.81 THERAPEUTIC DRUG MONITORING: ICD-10-CM

## 2021-06-11 DIAGNOSIS — E78.2 MIXED HYPERLIPIDEMIA: Chronic | ICD-10-CM

## 2021-06-11 DIAGNOSIS — E55.9 VITAMIN D DEFICIENCY: Chronic | ICD-10-CM

## 2021-06-11 DIAGNOSIS — Z00.00 ROUTINE PHYSICAL EXAMINATION: Primary | ICD-10-CM

## 2021-06-11 DIAGNOSIS — I10 BENIGN ESSENTIAL HYPERTENSION: Chronic | ICD-10-CM

## 2021-06-11 DIAGNOSIS — E03.8 SECONDARY HYPOTHYROIDISM: Chronic | ICD-10-CM

## 2021-06-11 DIAGNOSIS — Z83.71 FAMILY HISTORY OF COLONIC POLYPS: Chronic | ICD-10-CM

## 2021-06-11 DIAGNOSIS — R31.29 MICROSCOPIC HEMATURIA: Chronic | ICD-10-CM

## 2021-06-11 DIAGNOSIS — R73.01 IMPAIRED FASTING GLUCOSE: Chronic | ICD-10-CM

## 2021-06-11 PROCEDURE — 99396 PREV VISIT EST AGE 40-64: CPT | Performed by: INTERNAL MEDICINE

## 2021-06-11 RX ORDER — IRBESARTAN 150 MG/1
TABLET ORAL
Qty: 90 TABLET | Refills: 3 | Status: SHIPPED | OUTPATIENT
Start: 2021-06-11 | End: 2022-06-13

## 2021-06-11 RX ORDER — ROSUVASTATIN CALCIUM 40 MG/1
TABLET, COATED ORAL
Qty: 90 TABLET | Refills: 3 | Status: SHIPPED | OUTPATIENT
Start: 2021-06-11 | End: 2022-06-13

## 2021-06-11 RX ORDER — LEVOTHYROXINE SODIUM 0.07 MG/1
TABLET ORAL
Qty: 90 TABLET | Refills: 3 | Status: SHIPPED | OUTPATIENT
Start: 2021-06-11 | End: 2022-06-17

## 2021-06-11 RX ORDER — EZETIMIBE 10 MG/1
TABLET ORAL
Qty: 90 TABLET | Refills: 3 | Status: SHIPPED | OUTPATIENT
Start: 2021-06-11 | End: 2022-06-17

## 2021-06-11 NOTE — PROGRESS NOTES
06/11/2021    Patient Information  Gustavo Mcclendon                                                                                          [id number] [facility] [address]      1970  [unfilled]  There is no work phone number on file.    Chief Complaint:     Routine annual physical examination and follow-up blood work in order to monitor chronic medical issues listed in history of present illness.  No new acute complaints.    History of Present Illness:    Patient with a history of impaired fasting glucose, hyperlipidemia, hypertension, secondary hypothyroidism, vitamin D deficiency, history of colon polyps and family history of colon polyps, history of positive PPD, microscopic hematuria.  He presents today for his routine annual physical exam and follow-up blood work.  His past medical history reviewed and updated were necessary including health maintenance parameters.  This reveals he is currently up-to-date or else accounted for after today's visit.    Review of Systems   Constitutional: Negative.   HENT: Negative.    Eyes: Negative.    Cardiovascular: Negative.    Respiratory: Negative.    Endocrine: Negative.    Hematologic/Lymphatic: Negative.    Skin: Negative.    Musculoskeletal: Negative.    Gastrointestinal: Negative.    Genitourinary: Negative.    Neurological: Negative.    Psychiatric/Behavioral: Negative.    Allergic/Immunologic: Negative.        Active Problems:    Patient Active Problem List   Diagnosis   • Benign essential hypertension   • Hyperlipidemia   • Impaired fasting glucose   • Secondary hypothyroidism   • Vitamin D deficiency   • Therapeutic drug monitoring   • Routine physical examination   • ACE-inhibitor cough   • Family history of colonic polyps   • History of colon polyps, 5/17/2019--tubular adenoma x2   • History of positive PPD   • Microscopic hematuria         Past Medical History:   Diagnosis Date   • ACE-inhibitor cough 4/22/2016     04/22/2016--patient presents with a several month history of a dry cough related to the use of lisinopril.   • Benign essential hypertension 5/14/2009 05/14/2009--treatment for hypertension begun.   • Family history of colonic polyps 2/9/2018    Patient's paternal grandfather and paternal uncle had a history of colon polyps.   • History of colon polyps, 5/17/2019--tubular adenoma x2 11/1/2019    May 17, 2019--colonoscopy revealed a cecal polyp and a splenic flexure polyp.  Pathology returned tubular adenoma x2.   • History of positive PPD 11/1/2019   • Hyperlipidemia 1/1/1997 1997--treatment for hyperlipidemia begun.   • Impaired fasting glucose 2/18/2011 04/22/2011--initial hemoglobin A1c 6.3.  02/18/2011--initial diagnosis of impaired fasting glucose with a serum glucose of 111.   • Microscopic hematuria 5/11/2020   • Secondary hypothyroidism 5/31/2013 04/25/2014--correction: patient did have a contrasted MRI of the pituitary 08/19/2013 and it did not show any evidence of pituitary tumor.  04/19/2014--TSH is low at 0.363, normal range 0.45--4.5. T4 borderline at 4.5, normal range 4.5--12.0. Free thyroxine index normal at 1.7, normal range 1.2--4.9. Patient reports he has symptoms of fatigue as well as cold intolerance and constipation and would like to try supplementation. Levothyroxine 75 mcg per day initiated. Followup in 5 weeks with lab prior.   10/21/2013--patient was seen in consultation by the endocrinologist and repeat thyroid function tests were performed. 3 T3 was normal at 3.1. Thyroid antibodies were normal. TSH was low normal range at 0.452. Free T4 was normal at 1.36, normal range 0.8 to--1.77. Patient was felt to be euthyroid and only needed continued observation.   05/31/2013--TSH was low at 0.390. Question secondary hypothyroid. Repeat thyroid function tests revealed TSH to be normal at 0.46, normal range 0.27--4.20. T3 was low at 78.   • Vitamin D deficiency 3/28/2016         Past  "Surgical History:   Procedure Laterality Date   • COLONOSCOPY  05/17/2019    May 17, 2019--colonoscopy revealed a cecal polyp and a splenic flexure polyp.  Pathology returned tubular adenoma x2.   • DENTAL PROCEDURE  2012 2012--dental implant.         No Known Allergies        Current Outpatient Medications:   •  aspirin 81 MG tablet, Take 1 tablet by mouth daily., Disp: , Rfl:   •  Cholecalciferol (VITAMIN D3) 5000 UNITS capsule capsule, Take 1 capsule by mouth daily., Disp: , Rfl:   •  ezetimibe (ZETIA) 10 MG tablet, 1 p.o. daily for high cholesterol, Disp: 90 tablet, Rfl: 3  •  irbesartan (AVAPRO) 150 MG tablet, 1 p.o. daily for high blood pressure, Disp: 90 tablet, Rfl: 3  •  levothyroxine (SYNTHROID, LEVOTHROID) 75 MCG tablet, Take 1 p.o. daily for low thyroid, Disp: 90 tablet, Rfl: 3  •  rosuvastatin (Crestor) 40 MG tablet, 1 by mouth daily for cholesterol, Disp: 90 tablet, Rfl: 3      Family History   Problem Relation Age of Onset   • Coronary artery disease Mother    • Diabetes Mother    • Hypertension Brother          Social History     Socioeconomic History   • Marital status:      Spouse name: jose   • Number of children: Not on file   • Years of education: Not on file   • Highest education level: Professional school degree (e.g., MD, DDS, DVM, RICARDA)   Tobacco Use   • Smoking status: Never Smoker   • Smokeless tobacco: Never Used   Vaping Use   • Vaping Use: Never used   Substance and Sexual Activity   • Alcohol use: No   • Drug use: No   • Sexual activity: Yes     Partners: Female         Vitals:    06/11/21 1359   BP: 118/58   Pulse: 60   Resp: 16   Temp: 98 °F (36.7 °C)   SpO2: 100%   Weight: 66.5 kg (146 lb 9.6 oz)   Height: 162 cm (63.78\")        Body mass index is 25.34 kg/m².      Physical Exam:    General: Alert and oriented x 3.  No acute distress.  Normal affect.  HEENT: Pupils equal, round, reactive to light; extraocular movements intact; sclerae nonicteric; pharynx, ear canals and TMs " normal.  Neck: Without JVD, thyromegaly, bruit, or adenopathy.  Lungs: Clear to auscultation in all fields.  Heart: Regular rate and rhythm without murmur, rub, gallop, or click.  Abdomen: Soft, nontender, without hepatosplenomegaly or hernia.  Bowel sounds normal.  : Deferred.  Rectal: Deferred.  Extremities: Without clubbing, cyanosis, edema, or pulse deficit.  Neurologic: Intact without focal deficit.  Normal station and gait observed during ingress and egress from the examination room.  Skin: Without significant lesion.  Musculoskeletal: Unremarkable.    Lab/other results:    NMR reveals a total cholesterol of 161.  Triglycerides 63.  LDL particle number borderline at 1457.  Small LDL particle number elevated at 750.  HDL particle number low at 28.9.  CMP normal except glucose 104.  Hemoglobin A1c 5.71.  Hepatitis C antibody screening nonreactive.  CPK normal.  Vitamin D normal.  Urinalysis normal except 6-12 RBCs.  PSA normal at 1.24.  Thyroid function tests are normal except TSH is appropriately low at 0.189.    Assessment/Plan:     Diagnosis Plan   1. Routine physical examination  Hemoglobin A1c   2. Impaired fasting glucose  Comprehensive Metabolic Panel    Hemoglobin A1c   3. Hyperlipidemia  CK    Comprehensive Metabolic Panel    NMR LipoProfile    rosuvastatin (Crestor) 40 MG tablet    ezetimibe (ZETIA) 10 MG tablet   4. Benign essential hypertension  irbesartan (AVAPRO) 150 MG tablet   5. Secondary hypothyroidism  TSH    T4, Free    T3, Free    levothyroxine (SYNTHROID, LEVOTHROID) 75 MCG tablet   6. Vitamin D deficiency  Vitamin D 25 Hydroxy   7. History of colon polyps, 5/17/2019--tubular adenoma x2     8. Family history of colonic polyps     9. History of positive PPD     10. Microscopic hematuria  Urinalysis With Microscopic If Indicated (No Culture) - Urine, Clean Catch    Ambulatory Referral to Urology   11. Therapeutic drug monitoring  CBC (No Diff)    PSA DIAGNOSTIC     Patient presents with  essentially normal annual physical except for the following issues: He has mild impaired fasting glucose that does not require medication.  Strongly recommend low carbohydrate diet, exercise, and weight loss.  He has hyperlipidemia treated with Crestor and Zetia and his NMR profile is barely adequate.  Again, recommend low carbohydrate diet, exercise, and weight loss.  Blood pressure appears to be well controlled.  He has secondary hypothyroidism and is currently euthyroid.  Vitamin D in the normal range.  Patient has a history of colon polyps and is up-to-date on his colonoscopy.  He has a history of positive PPD and chest x-ray performed about a year and a half ago was normal.  He should have another chest x-ray.  He has microscopic hematuria that is persistent and I think he should see a neurologist regarding this.     Several preventative health issues discussed including review of vaccinations and recommendations, including dietary issues, exercise and weight loss.  Safe sex practices discussed.  Patient advised to wear seatbelt whenever driving and avoid texting and driving.  Also advised to look both ways before crossing the street.  Colon cancer prevention discussed and is up-to-date with colonoscopy.  Advised to avoid tobacco products and minimize alcohol consumption.    Plan is as follows: No change in current medical regimen.  Recommend low carbohydrate diet, exercise, and weight loss.  Urology referral given.  Patient will follow-up in 1 year with lab prior or follow-up as needed.  Medications refilled as appropriate.    Procedures

## 2021-07-22 ENCOUNTER — HOSPITAL ENCOUNTER (OUTPATIENT)
Dept: GENERAL RADIOLOGY | Facility: HOSPITAL | Age: 51
Discharge: HOME | End: 2021-07-22

## 2021-07-22 DIAGNOSIS — S29.8XXA BLUNT CHEST TRAUMA, INITIAL ENCOUNTER: ICD-10-CM

## 2021-07-22 DIAGNOSIS — S29.8XXA BLUNT CHEST TRAUMA, INITIAL ENCOUNTER: Primary | ICD-10-CM
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
## 2021-07-22 PROCEDURE — 71120 X-RAY EXAM BREASTBONE 2/>VWS: CPT

## 2021-07-22 PROCEDURE — 71046 X-RAY EXAM CHEST 2 VIEWS: CPT

## 2021-07-22 PROCEDURE — 71100 X-RAY EXAM RIBS UNI 2 VIEWS: CPT

## 2021-08-10 ENCOUNTER — LAB (OUTPATIENT)
Dept: LAB | Facility: HOSPITAL | Age: 51
End: 2021-08-10

## 2021-08-10 DIAGNOSIS — Z00.00 ROUTINE PHYSICAL EXAMINATION: ICD-10-CM

## 2021-08-10 DIAGNOSIS — E78.2 MIXED HYPERLIPIDEMIA: Chronic | ICD-10-CM

## 2021-08-10 DIAGNOSIS — R73.01 IMPAIRED FASTING GLUCOSE: Chronic | ICD-10-CM

## 2021-08-10 DIAGNOSIS — E03.8 SECONDARY HYPOTHYROIDISM: Chronic | ICD-10-CM

## 2021-08-10 DIAGNOSIS — Z51.81 THERAPEUTIC DRUG MONITORING: ICD-10-CM

## 2021-08-10 DIAGNOSIS — E55.9 VITAMIN D DEFICIENCY: Chronic | ICD-10-CM

## 2021-08-10 DIAGNOSIS — R31.29 MICROSCOPIC HEMATURIA: Chronic | ICD-10-CM

## 2021-08-10 LAB
25(OH)D3 SERPL-MCNC: 32.2 NG/ML (ref 30–100)
ALBUMIN SERPL-MCNC: 4.2 G/DL (ref 3.5–5.2)
ALBUMIN/GLOB SERPL: 1.7 G/DL
ALP SERPL-CCNC: 76 U/L (ref 39–117)
ALT SERPL W P-5'-P-CCNC: 15 U/L (ref 1–41)
ANION GAP SERPL CALCULATED.3IONS-SCNC: 9.2 MMOL/L (ref 5–15)
AST SERPL-CCNC: 14 U/L (ref 1–40)
BILIRUB SERPL-MCNC: 0.4 MG/DL (ref 0–1.2)
BUN SERPL-MCNC: 21 MG/DL (ref 6–20)
BUN/CREAT SERPL: 29.2 (ref 7–25)
CALCIUM SPEC-SCNC: 9.2 MG/DL (ref 8.6–10.5)
CHLORIDE SERPL-SCNC: 105 MMOL/L (ref 98–107)
CK SERPL-CCNC: 101 U/L (ref 20–200)
CO2 SERPL-SCNC: 25.8 MMOL/L (ref 22–29)
CREAT SERPL-MCNC: 0.72 MG/DL (ref 0.76–1.27)
DEPRECATED RDW RBC AUTO: 38.8 FL (ref 37–54)
ERYTHROCYTE [DISTWIDTH] IN BLOOD BY AUTOMATED COUNT: 12.5 % (ref 12.3–15.4)
GFR SERPL CREATININE-BSD FRML MDRD: 115 ML/MIN/1.73
GFR SERPL CREATININE-BSD FRML MDRD: 139 ML/MIN/1.73
GLOBULIN UR ELPH-MCNC: 2.5 GM/DL
GLUCOSE SERPL-MCNC: 100 MG/DL (ref 65–99)
HBA1C MFR BLD: 5.5 % (ref 4.8–5.6)
HCT VFR BLD AUTO: 43 % (ref 37.5–51)
HGB BLD-MCNC: 14 G/DL (ref 13–17.7)
MCH RBC QN AUTO: 28 PG (ref 26.6–33)
MCHC RBC AUTO-ENTMCNC: 32.6 G/DL (ref 31.5–35.7)
MCV RBC AUTO: 86 FL (ref 79–97)
PLATELET # BLD AUTO: 218 10*3/MM3 (ref 140–450)
PMV BLD AUTO: 11.7 FL (ref 6–12)
POTASSIUM SERPL-SCNC: 4 MMOL/L (ref 3.5–5.2)
PROT SERPL-MCNC: 6.7 G/DL (ref 6–8.5)
RBC # BLD AUTO: 5 10*6/MM3 (ref 4.14–5.8)
SODIUM SERPL-SCNC: 140 MMOL/L (ref 136–145)
T3FREE SERPL-MCNC: 2.84 PG/ML (ref 2–4.4)
T4 FREE SERPL-MCNC: 1.58 NG/DL (ref 0.93–1.7)
TSH SERPL DL<=0.05 MIU/L-ACNC: 0.15 UIU/ML (ref 0.27–4.2)
WBC # BLD AUTO: 5.53 10*3/MM3 (ref 3.4–10.8)

## 2021-08-10 PROCEDURE — 80053 COMPREHEN METABOLIC PANEL: CPT

## 2021-08-10 PROCEDURE — 83704 LIPOPROTEIN BLD QUAN PART: CPT

## 2021-08-10 PROCEDURE — 82306 VITAMIN D 25 HYDROXY: CPT

## 2021-08-10 PROCEDURE — 84481 FREE ASSAY (FT-3): CPT

## 2021-08-10 PROCEDURE — 36415 COLL VENOUS BLD VENIPUNCTURE: CPT

## 2021-08-10 PROCEDURE — 83036 HEMOGLOBIN GLYCOSYLATED A1C: CPT

## 2021-08-10 PROCEDURE — 82550 ASSAY OF CK (CPK): CPT

## 2021-08-10 PROCEDURE — 80061 LIPID PANEL: CPT

## 2021-08-10 PROCEDURE — 85027 COMPLETE CBC AUTOMATED: CPT

## 2021-08-10 PROCEDURE — 84439 ASSAY OF FREE THYROXINE: CPT

## 2021-08-10 PROCEDURE — 84443 ASSAY THYROID STIM HORMONE: CPT

## 2021-08-12 LAB
CHOLEST SERPL-MCNC: 153 MG/DL (ref 100–199)
HDL SERPL-SCNC: 31.8 UMOL/L
HDLC SERPL-MCNC: 50 MG/DL
LDL SERPL QN: 20.7 NM
LDL SERPL-SCNC: 1296 NMOL/L
LDL SMALL SERPL-SCNC: 689 NMOL/L
LDLC SERPL CALC-MCNC: 90 MG/DL (ref 0–99)
TRIGL SERPL-MCNC: 64 MG/DL (ref 0–149)

## 2022-06-13 DIAGNOSIS — E78.2 MIXED HYPERLIPIDEMIA: Chronic | ICD-10-CM

## 2022-06-13 DIAGNOSIS — I10 BENIGN ESSENTIAL HYPERTENSION: Chronic | ICD-10-CM

## 2022-06-13 RX ORDER — IRBESARTAN 150 MG/1
TABLET ORAL
Qty: 90 TABLET | Refills: 3 | Status: SHIPPED | OUTPATIENT
Start: 2022-06-13

## 2022-06-13 RX ORDER — ROSUVASTATIN CALCIUM 40 MG/1
TABLET, COATED ORAL
Qty: 90 TABLET | Refills: 3 | Status: SHIPPED | OUTPATIENT
Start: 2022-06-13

## 2022-06-15 DIAGNOSIS — E78.2 MIXED HYPERLIPIDEMIA: Chronic | ICD-10-CM

## 2022-06-15 DIAGNOSIS — E03.8 SECONDARY HYPOTHYROIDISM: Chronic | ICD-10-CM

## 2022-06-17 RX ORDER — LEVOTHYROXINE SODIUM 0.07 MG/1
TABLET ORAL
Qty: 90 TABLET | Refills: 0 | Status: SHIPPED | OUTPATIENT
Start: 2022-06-17 | End: 2023-01-19 | Stop reason: SDUPTHER

## 2022-06-17 RX ORDER — EZETIMIBE 10 MG/1
TABLET ORAL
Qty: 90 TABLET | Refills: 0 | Status: SHIPPED | OUTPATIENT
Start: 2022-06-17

## 2022-09-06 ENCOUNTER — OFFICE VISIT (OUTPATIENT)
Dept: INTERNAL MEDICINE | Facility: CLINIC | Age: 52
End: 2022-09-06

## 2022-09-06 ENCOUNTER — LAB (OUTPATIENT)
Dept: LAB | Facility: HOSPITAL | Age: 52
End: 2022-09-06

## 2022-09-06 VITALS
SYSTOLIC BLOOD PRESSURE: 120 MMHG | RESPIRATION RATE: 18 BRPM | WEIGHT: 137.6 LBS | HEART RATE: 62 BPM | HEIGHT: 63 IN | DIASTOLIC BLOOD PRESSURE: 66 MMHG | BODY MASS INDEX: 24.38 KG/M2 | OXYGEN SATURATION: 98 %

## 2022-09-06 DIAGNOSIS — R73.01 IMPAIRED FASTING GLUCOSE: Chronic | ICD-10-CM

## 2022-09-06 DIAGNOSIS — E55.9 VITAMIN D DEFICIENCY: Chronic | ICD-10-CM

## 2022-09-06 DIAGNOSIS — Z12.11 COLON CANCER SCREENING: ICD-10-CM

## 2022-09-06 DIAGNOSIS — I25.10 OCCLUSIVE CORONARY ARTERY DISEASE: ICD-10-CM

## 2022-09-06 DIAGNOSIS — I97.0 POST PERICARDIOTOMY SYNDROME: ICD-10-CM

## 2022-09-06 DIAGNOSIS — R05.8 ACE-INHIBITOR COUGH: Chronic | ICD-10-CM

## 2022-09-06 DIAGNOSIS — I10 BENIGN ESSENTIAL HYPERTENSION: Chronic | ICD-10-CM

## 2022-09-06 DIAGNOSIS — Z92.89 HISTORY OF POSITIVE PPD: Chronic | ICD-10-CM

## 2022-09-06 DIAGNOSIS — Z83.71 FAMILY HISTORY OF COLONIC POLYPS: Chronic | ICD-10-CM

## 2022-09-06 DIAGNOSIS — E03.8 SECONDARY HYPOTHYROIDISM: Chronic | ICD-10-CM

## 2022-09-06 DIAGNOSIS — Z86.010 HISTORY OF COLON POLYPS: Chronic | ICD-10-CM

## 2022-09-06 DIAGNOSIS — Z82.49 FAMILY HISTORY OF PREMATURE CORONARY ARTERY DISEASE: ICD-10-CM

## 2022-09-06 DIAGNOSIS — R31.29 MICROSCOPIC HEMATURIA: Chronic | ICD-10-CM

## 2022-09-06 DIAGNOSIS — Z95.1 HX OF CABG: ICD-10-CM

## 2022-09-06 DIAGNOSIS — Z51.81 THERAPEUTIC DRUG MONITORING: ICD-10-CM

## 2022-09-06 DIAGNOSIS — Z00.00 ROUTINE PHYSICAL EXAMINATION: Primary | ICD-10-CM

## 2022-09-06 DIAGNOSIS — E78.2 MIXED HYPERLIPIDEMIA: Chronic | ICD-10-CM

## 2022-09-06 DIAGNOSIS — T46.4X5A ACE-INHIBITOR COUGH: Chronic | ICD-10-CM

## 2022-09-06 LAB
25(OH)D3 SERPL-MCNC: 54.4 NG/ML (ref 30–100)
ALBUMIN SERPL-MCNC: 4.2 G/DL (ref 3.5–5.2)
ALBUMIN/GLOB SERPL: 1.5 G/DL
ALP SERPL-CCNC: 85 U/L (ref 39–117)
ALT SERPL W P-5'-P-CCNC: 15 U/L (ref 1–41)
ANION GAP SERPL CALCULATED.3IONS-SCNC: 7.9 MMOL/L (ref 5–15)
AST SERPL-CCNC: 18 U/L (ref 1–40)
BILIRUB SERPL-MCNC: 0.4 MG/DL (ref 0–1.2)
BILIRUB UR QL STRIP: NEGATIVE
BUN SERPL-MCNC: 14 MG/DL (ref 6–20)
BUN/CREAT SERPL: 17.3 (ref 7–25)
CALCIUM SPEC-SCNC: 9.4 MG/DL (ref 8.6–10.5)
CHLORIDE SERPL-SCNC: 104 MMOL/L (ref 98–107)
CK SERPL-CCNC: 66 U/L (ref 20–200)
CLARITY UR: CLEAR
CO2 SERPL-SCNC: 27.1 MMOL/L (ref 22–29)
COLOR UR: YELLOW
CREAT SERPL-MCNC: 0.81 MG/DL (ref 0.76–1.27)
DEPRECATED RDW RBC AUTO: 37.6 FL (ref 37–54)
EGFRCR SERPLBLD CKD-EPI 2021: 106.1 ML/MIN/1.73
ERYTHROCYTE [DISTWIDTH] IN BLOOD BY AUTOMATED COUNT: 12.1 % (ref 12.3–15.4)
GLOBULIN UR ELPH-MCNC: 2.8 GM/DL
GLUCOSE SERPL-MCNC: 90 MG/DL (ref 65–99)
GLUCOSE UR STRIP-MCNC: NEGATIVE MG/DL
HBA1C MFR BLD: 5.3 % (ref 4.8–5.6)
HCT VFR BLD AUTO: 41.7 % (ref 37.5–51)
HGB BLD-MCNC: 12.9 G/DL (ref 13–17.7)
HGB UR QL STRIP.AUTO: NEGATIVE
KETONES UR QL STRIP: NEGATIVE
LEUKOCYTE ESTERASE UR QL STRIP.AUTO: NEGATIVE
MCH RBC QN AUTO: 26.7 PG (ref 26.6–33)
MCHC RBC AUTO-ENTMCNC: 30.9 G/DL (ref 31.5–35.7)
MCV RBC AUTO: 86.2 FL (ref 79–97)
NITRITE UR QL STRIP: NEGATIVE
PH UR STRIP.AUTO: 6.5 [PH] (ref 5–8)
PLATELET # BLD AUTO: 298 10*3/MM3 (ref 140–450)
PMV BLD AUTO: 10.9 FL (ref 6–12)
POTASSIUM SERPL-SCNC: 4.2 MMOL/L (ref 3.5–5.2)
PROT SERPL-MCNC: 7 G/DL (ref 6–8.5)
PROT UR QL STRIP: NEGATIVE
PSA SERPL-MCNC: 0.97 NG/ML (ref 0–4)
RBC # BLD AUTO: 4.84 10*6/MM3 (ref 4.14–5.8)
SODIUM SERPL-SCNC: 139 MMOL/L (ref 136–145)
SP GR UR STRIP: 1.02 (ref 1–1.03)
T3FREE SERPL-MCNC: 2.95 PG/ML (ref 2–4.4)
T4 FREE SERPL-MCNC: 2.03 NG/DL (ref 0.93–1.7)
TSH SERPL DL<=0.05 MIU/L-ACNC: 0.02 UIU/ML (ref 0.27–4.2)
UROBILINOGEN UR QL STRIP: NORMAL
WBC NRBC COR # BLD: 5.68 10*3/MM3 (ref 3.4–10.8)

## 2022-09-06 PROCEDURE — 82306 VITAMIN D 25 HYDROXY: CPT | Performed by: INTERNAL MEDICINE

## 2022-09-06 PROCEDURE — 84481 FREE ASSAY (FT-3): CPT | Performed by: INTERNAL MEDICINE

## 2022-09-06 PROCEDURE — 81003 URINALYSIS AUTO W/O SCOPE: CPT | Performed by: INTERNAL MEDICINE

## 2022-09-06 PROCEDURE — 36415 COLL VENOUS BLD VENIPUNCTURE: CPT | Performed by: INTERNAL MEDICINE

## 2022-09-06 PROCEDURE — 84153 ASSAY OF PSA TOTAL: CPT | Performed by: INTERNAL MEDICINE

## 2022-09-06 PROCEDURE — 83704 LIPOPROTEIN BLD QUAN PART: CPT | Performed by: INTERNAL MEDICINE

## 2022-09-06 PROCEDURE — 82550 ASSAY OF CK (CPK): CPT | Performed by: INTERNAL MEDICINE

## 2022-09-06 PROCEDURE — 83036 HEMOGLOBIN GLYCOSYLATED A1C: CPT | Performed by: INTERNAL MEDICINE

## 2022-09-06 PROCEDURE — 84439 ASSAY OF FREE THYROXINE: CPT | Performed by: INTERNAL MEDICINE

## 2022-09-06 PROCEDURE — 99396 PREV VISIT EST AGE 40-64: CPT | Performed by: INTERNAL MEDICINE

## 2022-09-06 PROCEDURE — 80061 LIPID PANEL: CPT | Performed by: INTERNAL MEDICINE

## 2022-09-06 PROCEDURE — 80050 GENERAL HEALTH PANEL: CPT | Performed by: INTERNAL MEDICINE

## 2022-09-06 RX ORDER — METOPROLOL SUCCINATE 50 MG/1
50 TABLET, EXTENDED RELEASE ORAL 2 TIMES DAILY
COMMUNITY
Start: 2022-08-09 | End: 2022-09-06

## 2022-09-06 RX ORDER — CLOPIDOGREL BISULFATE 75 MG/1
TABLET ORAL
COMMUNITY
Start: 2022-06-22

## 2022-09-06 RX ORDER — AMLODIPINE BESYLATE 5 MG/1
TABLET ORAL
COMMUNITY
Start: 2022-07-21

## 2022-09-06 NOTE — PROGRESS NOTES
09/06/2022    Patient Information  Gustavo Mcclendon                                                                                          [id number] [facility] [id number]      1970  [unfilled]  There is no work phone number on file.    Chief Complaint:     Routine annual physical examination and follow-up blood work.  No new acute complaints.    History of Present Illness:    Patient who has a relatively new diagnosis of occlusive coronary artery disease, status post four-vessel CABG in June of this year, history of post pericardiotomy syndrome, impaired fasting glucose, hyperlipidemia, hypertension, secondary hypothyroidism, vitamin D deficiency, family history of colon polyps, personal history of colon polyps, history of positive PPD, microscopic hematuria, ACE inhibitor induced cough.  He presents today for his routine annual physical exam and follow-up blood work.  His past medical history reviewed and updated were necessary including health maintenance parameters.  This reveals he will be up-to-date or else accounted for after today's visit.  The exception would be a colonoscopy which I have ordered.  Shingrix vaccine also recommended and patient will check with his insurance company    Review of Systems   Constitutional: Negative.   HENT: Negative.    Eyes: Negative.    Cardiovascular: Negative.    Respiratory: Negative.    Endocrine: Negative.    Hematologic/Lymphatic: Negative.    Skin: Negative.    Musculoskeletal: Negative.    Gastrointestinal: Negative.    Genitourinary: Negative.    Neurological: Negative.    Psychiatric/Behavioral: Negative.    Allergic/Immunologic: Negative.        Active Problems:    Patient Active Problem List   Diagnosis   • Benign essential hypertension   • Hyperlipidemia   • Impaired fasting glucose   • Secondary hypothyroidism   • Vitamin D deficiency   • Therapeutic drug monitoring   • Routine physical examination   • ACE-inhibitor cough   •  Family history of colonic polyps   • History of colon polyps, 2019--tubular adenoma x2   • History of positive PPD   • Microscopic hematuria   • Occlusive coronary artery disease, 2022--S/P OFF PUMP CORONARY ARTERY BYPASS GRAFT X 4 (LIMA-LAD,ROLY-OM SVG-DX;SVG-PLB)   • Hx of CABG,  2022--S/P OFF PUMP CORONARY ARTERY BYPASS GRAFT X 4 (LIMA-LAD,ROLY-OM SVG-DX;SVG-PLB)   • Family history of premature coronary artery disease         Past Medical History:   Diagnosis Date   • ACE-inhibitor cough 2016--patient presents with a several month history of a dry cough related to the use of lisinopril.   • Benign essential hypertension 2009--treatment for hypertension begun.   • Family history of colonic polyps 2018    Patient's paternal grandfather and paternal uncle had a history of colon polyps.   • Family history of premature coronary artery disease 2022    Mother  from coronary artery disease age 56.  Maternal cousin with premature coronary artery disease   • History of colon polyps, 2019--tubular adenoma x2 2019    May 17, 2019--colonoscopy revealed a cecal polyp and a splenic flexure polyp.  Pathology returned tubular adenoma x2.   • History of positive PPD 2019   • Hx of CABG,  2022--S/P OFF PUMP CORONARY ARTERY BYPASS GRAFT X 4 (LIMA-LAD,ROLY-OM SVG-DX;SVG-PLB) 2022    Discharge Date 2022 Name Gustavo Mcclendon  Time 9:00 AM MRN [id number]  Surgeon Dr.Hashmi Vega 1970  Admit 2022 10:07 AM CSN [id number]  Translation Services: Not Required GetFeedback ID #: None Final Diagnoses: Stable condition S/P OFF PUMP CORONARY ARTERY BYPASS GRAFT X 4 (LIMA-LAD,ROLY-OM SVG-DX;SVG-PLB) INTRAOP LOCALIZATION OF VASCULAR CONDUIT,RIGHT ENDOVEIN HARVEST,GRAFT PATENCY    • Hyperlipidemia 1997--treatment for hyperlipidemia begun.   • Impaired fasting glucose 2011--initial hemoglobin A1c 6.3.   2011--initial diagnosis of impaired fasting glucose with a serum glucose of 111.   • Microscopic hematuria 2020   • Occlusive coronary artery disease, 2022--S/P OFF PUMP CORONARY ARTERY BYPASS GRAFT X 4 (LIMA-LAD,ROLY-OM SVG-DX;SVG-PLB) 2022    Discharge Date 2022 Name Gustavo Mcclendon  Time 9:00 AM MRN [id number]  Surgeon Dr.Hashmi Vega 1970  Admit 2022 10:07 AM [facility] [id number]  Translation Services: Not Required [employer] ID #: None Final Diagnoses: Stable condition S/P OFF PUMP CORONARY ARTERY BYPASS GRAFT X 4 (LIMA-LAD,ROLY-OM SVG-DX;SVG-PLB) INTRAOP LOCALIZATION OF VASCULAR CONDUIT,RIGHT ENDOVEIN HARVEST,GRAFT PATENCY    • Secondary hypothyroidism 2014--correction: patient did have a contrasted MRI of the pituitary 2013 and it did not show any evidence of pituitary tumor.  2014--TSH is low at 0.363, normal range 0.45--4.5. T4 borderline at 4.5, normal range 4.5--12.0. Free thyroxine index normal at 1.7, normal range 1.2--4.9. Patient reports he has symptoms of fatigue as well as cold intolerance and constipation and would like to try supplementation. Levothyroxine 75 mcg per day initiated. Followup in 5 weeks with lab prior.   10/21/2013--patient was seen in consultation by the endocrinologist and repeat thyroid function tests were performed. 3 T3 was normal at 3.1. Thyroid antibodies were normal. TSH was low normal range at 0.452. Free T4 was normal at 1.36, normal range 0.8 to--1.77. Patient was felt to be euthyroid and only needed continued observation.   2013--TSH was low at 0.390. Question secondary hypothyroid. Repeat thyroid function tests revealed TSH to be normal at 0.46, normal range 0.27--4.20. T3 was low at 78.   • Vitamin D deficiency 3/28/2016         Past Surgical History:   Procedure Laterality Date   • COLONOSCOPY  2019    May 17, 2019--colonoscopy revealed a cecal polyp and a splenic flexure polyp.  Pathology returned  "tubular adenoma x2.   • DENTAL PROCEDURE  2012--dental implant.         No Known Allergies        Current Outpatient Medications:   •  aspirin 81 MG tablet, Take 1 tablet by mouth daily., Disp: , Rfl:   •  Cholecalciferol (VITAMIN D3) 5000 UNITS capsule capsule, Take 1 capsule by mouth daily., Disp: , Rfl:   •  clopidogrel (Plavix) 75 MG tablet,  mg Tab, Oral, Daily, 0 Refill(s), Disp: , Rfl:   •  ezetimibe (ZETIA) 10 MG tablet, TAKE 1 TABLET BY MOUTH DAILY FOR HIGH CHOLESTEROL, Disp: 90 tablet, Rfl: 0  •  irbesartan (AVAPRO) 150 MG tablet, TAKE 1 TABLET BY MOUTH EVERY DAY FOR HIGH BLOOD PRESSURE, Disp: 90 tablet, Rfl: 3  •  levothyroxine (SYNTHROID, LEVOTHROID) 75 MCG tablet, TAKE 1 TABLET BY MOUTH DAILY FOR LOW THYROID, Disp: 90 tablet, Rfl: 0  •  rosuvastatin (CRESTOR) 40 MG tablet, TAKE 1 TABLET BY MOUTH DAILY FOR CHOLESTEROL, Disp: 90 tablet, Rfl: 3  •  amLODIPine (NORVASC) 5 MG tablet, TAKE 1 TABLET BY MOUTH 1 TIME EACH DAY., Disp: , Rfl:       Family History   Problem Relation Age of Onset   • Coronary artery disease Mother         Mother  at age 56 from coronary artery disease   • Diabetes Mother    • Hypertension Brother    • Coronary artery disease Cousin         Mother sister and son with premature coronary artery disease, status post CABG         Social History     Socioeconomic History   • Marital status:      Spouse name: jose   • Highest education level: Professional school degree (e.g., MD, DDS, DVM, RICARDA)   Tobacco Use   • Smoking status: Never Smoker   • Smokeless tobacco: Never Used   Vaping Use   • Vaping Use: Never used   Substance and Sexual Activity   • Alcohol use: No   • Drug use: No   • Sexual activity: Yes     Partners: Female         Vitals:    22 1506   BP: 120/66   Pulse: 62   Resp: 18   SpO2: 98%   Weight: 62.4 kg (137 lb 9.6 oz)   Height: 160 cm (63\")        Body mass index is 24.37 kg/m².      Physical Exam:    General: Alert and oriented x 3.  No acute " distress.  Normal affect.  HEENT: Pupils equal, round, reactive to light; extraocular movements intact; sclerae nonicteric; pharynx, ear canals and TMs normal.  Neck: Without JVD, thyromegaly, bruit, or adenopathy.  Lungs: Clear to auscultation in all fields.  Heart: Regular rate and rhythm without murmur, rub, gallop, or click.  Abdomen: Soft, nontender, without hepatosplenomegaly or hernia.  Bowel sounds normal.  : Deferred.  Rectal: Deferred.  Extremities: Without clubbing, cyanosis, edema, or pulse deficit.  Neurologic: Intact without focal deficit.  Normal station and gait observed during ingress and egress from the examination room.  Skin: Without significant lesion.  Musculoskeletal: Unremarkable.    Lab/other results:    Lab work was not done prior to this visit    Assessment/Plan:     Diagnosis Plan   1. Routine physical examination  CBC (No Diff)    CK    Comprehensive Metabolic Panel    Hemoglobin A1c    NMR LipoProfile    TSH    T4, Free    T3, Free    PSA DIAGNOSTIC    Vitamin D 25 Hydroxy    Urinalysis With Microscopic If Indicated (No Culture) - Urine, Clean Catch   2. Occlusive coronary artery disease     3. Post pericardiotomy syndrome     4. Hx of CABG     5. Impaired fasting glucose  Comprehensive Metabolic Panel    Hemoglobin A1c    Comprehensive Metabolic Panel    Hemoglobin A1c    Urinalysis With Microscopic If Indicated (No Culture) - Urine, Clean Catch   6. Hyperlipidemia  CK    Comprehensive Metabolic Panel    NMR LipoProfile    CK    Comprehensive Metabolic Panel    NMR LipoProfile    TSH    T4, Free    T3, Free   7. Benign essential hypertension     8. Secondary hypothyroidism  TSH    T4, Free    T3, Free   9. Vitamin D deficiency  Vitamin D 25 Hydroxy    Vitamin D 25 Hydroxy   10. Family history of colonic polyps     11. History of colon polyps, 5/17/2019--tubular adenoma x2  Ambulatory Referral For Screening Colonoscopy   12. History of positive PPD     13. Microscopic hematuria      14. ACE-inhibitor cough     15. Therapeutic drug monitoring  CBC (No Diff)    CBC (No Diff)    PSA DIAGNOSTIC   16. Colon cancer screening  Ambulatory Referral For Screening Colonoscopy   17. Family history of premature coronary artery disease       Patient presents with essentially normal annual physical except for the following issues: He has a surprising diagnosis of occlusive coronary artery disease requiring emergent four-vessel CABG.  Patient had some problems with post pericardiotomy syndrome but the symptoms have resolved.  His risk factors include impaired fasting glucose, hyperlipidemia, hypertension.  These all need to be evaluated with appropriate lab work.  Strict risk factor modification is indicated.  Patient is not overweight and he does not smoke.  Patient also has a history of colon polyps and is due for colonoscopy.    Several preventative health issues discussed including review of vaccinations and recommendations, including dietary issues, exercise and weight loss.  Safe sex practices discussed.  Patient advised to wear seatbelt whenever driving and avoid texting and driving.  Also advised to look both ways before crossing the street.  Colon cancer prevention discussed and is up-to-date with colonoscopy.  Advised to avoid tobacco products and minimize alcohol consumption.    Plan is as follows: Set up fasting lab work and I will follow-up on the phone for the results and possible further instructions.  No changes to current medical regimen at the present time.  Colonoscopy referral.  Patient should check with his insurance company regarding coverage of Shingrix vaccine.  I have highly recommended this.  I will go ahead and schedule him for fasting lab work and follow-up in 6 months.        Procedures

## 2022-09-09 LAB
CHOLEST SERPL-MCNC: 171 MG/DL (ref 100–199)
HDL SERPL-SCNC: 31.4 UMOL/L
HDLC SERPL-MCNC: 51 MG/DL
LDL SERPL QN: 21.5 NM
LDL SERPL-SCNC: 1351 NMOL/L
LDL SMALL SERPL-SCNC: 429 NMOL/L
LDLC SERPL CALC-MCNC: 107 MG/DL (ref 0–99)
TRIGL SERPL-MCNC: 67 MG/DL (ref 0–149)

## 2023-01-19 ENCOUNTER — OFFICE VISIT (OUTPATIENT)
Dept: INTERNAL MEDICINE | Facility: CLINIC | Age: 53
End: 2023-01-19
Payer: COMMERCIAL

## 2023-01-19 VITALS
WEIGHT: 141 LBS | DIASTOLIC BLOOD PRESSURE: 64 MMHG | SYSTOLIC BLOOD PRESSURE: 112 MMHG | TEMPERATURE: 98.6 F | HEIGHT: 63 IN | HEART RATE: 99 BPM | BODY MASS INDEX: 24.98 KG/M2 | OXYGEN SATURATION: 100 %

## 2023-01-19 DIAGNOSIS — E03.8 SECONDARY HYPOTHYROIDISM: Chronic | ICD-10-CM

## 2023-01-19 DIAGNOSIS — J40 BRONCHITIS: Primary | ICD-10-CM

## 2023-01-19 PROBLEM — J10.1 INFLUENZA A: Status: ACTIVE | Noted: 2023-01-19

## 2023-01-19 LAB
EXPIRATION DATE: NORMAL
FLUAV AG NPH QL: NEGATIVE
FLUBV AG NPH QL: NEGATIVE
INTERNAL CONTROL: NORMAL
Lab: NORMAL

## 2023-01-19 PROCEDURE — 99213 OFFICE O/P EST LOW 20 MIN: CPT | Performed by: FAMILY MEDICINE

## 2023-01-19 PROCEDURE — 87804 INFLUENZA ASSAY W/OPTIC: CPT | Performed by: FAMILY MEDICINE

## 2023-01-19 RX ORDER — AZITHROMYCIN 250 MG/1
TABLET, FILM COATED ORAL
Qty: 6 TABLET | Refills: 0 | Status: SHIPPED | OUTPATIENT
Start: 2023-01-19

## 2023-01-19 RX ORDER — LEVOTHYROXINE SODIUM 0.07 MG/1
TABLET ORAL
Qty: 90 TABLET | Refills: 3 | Status: SHIPPED | OUTPATIENT
Start: 2023-01-19

## 2023-01-19 RX ORDER — EZETIMIBE 10 MG/1
10 TABLET ORAL DAILY
COMMUNITY
Start: 2022-11-30 | End: 2023-01-19 | Stop reason: SDUPTHER

## 2023-01-19 RX ORDER — LEVOTHYROXINE SODIUM 0.07 MG/1
TABLET ORAL
Qty: 90 TABLET | Refills: 0 | Status: SHIPPED | OUTPATIENT
Start: 2023-01-19 | End: 2023-01-19 | Stop reason: SDUPTHER

## 2023-01-19 RX ORDER — OSELTAMIVIR PHOSPHATE 75 MG/1
75 CAPSULE ORAL 2 TIMES DAILY
Qty: 10 CAPSULE | Refills: 0 | Status: SHIPPED | OUTPATIENT
Start: 2023-01-19

## 2023-01-19 RX ORDER — METOPROLOL SUCCINATE 50 MG/1
TABLET, EXTENDED RELEASE ORAL
COMMUNITY
Start: 2022-12-05

## 2023-01-19 NOTE — PROGRESS NOTES
"Chief Complaint  Sore Throat (Pt stated symptoms started 1-15-23; pt did a home covid test 1-18-23 and was negative), Cough (Yellowish sputum), Chills, and Nasal Congestion    Subjective        Gustavo Mcclendon presents to [facility]  History of Present Illness  Patient appointment for acute illness fever cough chills getting worse over the last 5 days more productive sputum minimal sore throat  No known exposures  Objective   Vital Signs:  /64   Pulse 99   Temp 98.6 °F (37 °C)   Ht 160 cm (62.99\")   Wt 64 kg (141 lb)   SpO2 100%   BMI 24.98 kg/m²   Estimated body mass index is 24.98 kg/m² as calculated from the following:    Height as of this encounter: 160 cm (62.99\").    Weight as of this encounter: 64 kg (141 lb).       BMI is within normal parameters. No other follow-up for BMI required.      Physical Exam  Vitals and nursing note reviewed.   Constitutional:       Appearance: Normal appearance.   HENT:      Head: Normocephalic and atraumatic.      Right Ear: Tympanic membrane, ear canal and external ear normal.      Left Ear: Tympanic membrane, ear canal and external ear normal.      Nose: No congestion.      Mouth/Throat:      Mouth: Mucous membranes are moist.   Eyes:      General: No scleral icterus.  Cardiovascular:      Pulses: Normal pulses.      Heart sounds: Normal heart sounds.   Pulmonary:      Effort: Pulmonary effort is normal.      Breath sounds: Normal breath sounds.   Neurological:      Mental Status: He is alert.        Result Review :                   Assessment and Plan   Diagnoses and all orders for this visit:    1. Bronchitis (Primary)    Other orders  -     oseltamivir (Tamiflu) 75 MG capsule; Take 1 capsule by mouth 2 (Two) Times a Day.  Dispense: 10 capsule; Refill: 0  -     azithromycin (Zithromax Z-Ortiz) 250 MG tablet; Take 2 tablets by mouth on day 1, then 1 tablet daily on days 2-5  Dispense: 6 tablet; Refill: 0             Follow Up   No " follow-ups on file.  Patient was given instructions and counseling regarding his condition or for health maintenance advice. Please see specific information pulled into the AVS if appropriate.

## 2023-01-24 ENCOUNTER — TELEPHONE (OUTPATIENT)
Dept: SURGERY | Facility: CLINIC | Age: 53
End: 2023-01-24
Payer: COMMERCIAL

## 2023-01-24 NOTE — TELEPHONE ENCOUNTER
Pt on recall list from Dr. Kendall.  CAC forms last updated 04/2019.  Pt needs new CAC.  Sarah will send.

## 2023-02-10 ENCOUNTER — LAB (OUTPATIENT)
Dept: LAB | Facility: HOSPITAL | Age: 53
End: 2023-02-10
Payer: COMMERCIAL

## 2023-02-10 DIAGNOSIS — R73.01 IMPAIRED FASTING GLUCOSE: Chronic | ICD-10-CM

## 2023-02-10 DIAGNOSIS — E78.2 MIXED HYPERLIPIDEMIA: Chronic | ICD-10-CM

## 2023-02-10 DIAGNOSIS — E55.9 VITAMIN D DEFICIENCY: Chronic | ICD-10-CM

## 2023-02-10 DIAGNOSIS — E03.8 SECONDARY HYPOTHYROIDISM: Chronic | ICD-10-CM

## 2023-02-10 DIAGNOSIS — Z51.81 THERAPEUTIC DRUG MONITORING: ICD-10-CM

## 2023-02-10 LAB
25(OH)D3 SERPL-MCNC: 56.9 NG/ML (ref 30–100)
ALBUMIN SERPL-MCNC: 4.2 G/DL (ref 3.5–5.2)
ALBUMIN/GLOB SERPL: 2.1 G/DL
ALP SERPL-CCNC: 78 U/L (ref 39–117)
ALT SERPL W P-5'-P-CCNC: 16 U/L (ref 1–41)
ANION GAP SERPL CALCULATED.3IONS-SCNC: 6 MMOL/L (ref 5–15)
AST SERPL-CCNC: 19 U/L (ref 1–40)
BILIRUB SERPL-MCNC: 0.4 MG/DL (ref 0–1.2)
BUN SERPL-MCNC: 14 MG/DL (ref 6–20)
BUN/CREAT SERPL: 15.2 (ref 7–25)
CALCIUM SPEC-SCNC: 9.2 MG/DL (ref 8.6–10.5)
CHLORIDE SERPL-SCNC: 107 MMOL/L (ref 98–107)
CK SERPL-CCNC: 77 U/L (ref 20–200)
CO2 SERPL-SCNC: 29 MMOL/L (ref 22–29)
CREAT SERPL-MCNC: 0.92 MG/DL (ref 0.76–1.27)
DEPRECATED RDW RBC AUTO: 40.2 FL (ref 37–54)
EGFRCR SERPLBLD CKD-EPI 2021: 99.5 ML/MIN/1.73
ERYTHROCYTE [DISTWIDTH] IN BLOOD BY AUTOMATED COUNT: 12.9 % (ref 12.3–15.4)
GLOBULIN UR ELPH-MCNC: 2 GM/DL
GLUCOSE SERPL-MCNC: 107 MG/DL (ref 65–99)
HBA1C MFR BLD: 5.8 % (ref 4.8–5.6)
HCT VFR BLD AUTO: 39.2 % (ref 37.5–51)
HGB BLD-MCNC: 12.6 G/DL (ref 13–17.7)
MCH RBC QN AUTO: 27.6 PG (ref 26.6–33)
MCHC RBC AUTO-ENTMCNC: 32.1 G/DL (ref 31.5–35.7)
MCV RBC AUTO: 85.8 FL (ref 79–97)
PLATELET # BLD AUTO: 206 10*3/MM3 (ref 140–450)
PMV BLD AUTO: 11.7 FL (ref 6–12)
POTASSIUM SERPL-SCNC: 4.6 MMOL/L (ref 3.5–5.2)
PROT SERPL-MCNC: 6.2 G/DL (ref 6–8.5)
RBC # BLD AUTO: 4.57 10*6/MM3 (ref 4.14–5.8)
SODIUM SERPL-SCNC: 142 MMOL/L (ref 136–145)
T3FREE SERPL-MCNC: 3.25 PG/ML (ref 2–4.4)
T4 FREE SERPL-MCNC: 2.12 NG/DL (ref 0.93–1.7)
TSH SERPL DL<=0.05 MIU/L-ACNC: 0.01 UIU/ML (ref 0.27–4.2)
WBC NRBC COR # BLD: 4.21 10*3/MM3 (ref 3.4–10.8)

## 2023-02-10 PROCEDURE — 36415 COLL VENOUS BLD VENIPUNCTURE: CPT

## 2023-02-10 PROCEDURE — 84481 FREE ASSAY (FT-3): CPT

## 2023-02-10 PROCEDURE — 80061 LIPID PANEL: CPT

## 2023-02-10 PROCEDURE — 80050 GENERAL HEALTH PANEL: CPT

## 2023-02-10 PROCEDURE — 82550 ASSAY OF CK (CPK): CPT

## 2023-02-10 PROCEDURE — 83036 HEMOGLOBIN GLYCOSYLATED A1C: CPT

## 2023-02-10 PROCEDURE — 83704 LIPOPROTEIN BLD QUAN PART: CPT

## 2023-02-10 PROCEDURE — 82306 VITAMIN D 25 HYDROXY: CPT

## 2023-02-10 PROCEDURE — 84439 ASSAY OF FREE THYROXINE: CPT

## 2023-02-11 LAB
CHOLEST SERPL-MCNC: 139 MG/DL (ref 100–199)
HDL SERPL-SCNC: 29.7 UMOL/L
HDLC SERPL-MCNC: 48 MG/DL
LDL SERPL QN: 21.1 NM
LDL SERPL-SCNC: 968 NMOL/L
LDL SMALL SERPL-SCNC: 450 NMOL/L
LDLC SERPL CALC-MCNC: 79 MG/DL (ref 0–99)
TRIGL SERPL-MCNC: 56 MG/DL (ref 0–149)

## 2023-05-29 DIAGNOSIS — E78.2 MIXED HYPERLIPIDEMIA: Chronic | ICD-10-CM

## 2023-05-31 DIAGNOSIS — I10 BENIGN ESSENTIAL HYPERTENSION: Chronic | ICD-10-CM

## 2023-05-31 RX ORDER — IRBESARTAN 150 MG/1
TABLET ORAL
Qty: 90 TABLET | Refills: 0 | Status: SHIPPED | OUTPATIENT
Start: 2023-05-31

## 2023-05-31 RX ORDER — ROSUVASTATIN CALCIUM 40 MG/1
TABLET, COATED ORAL
Qty: 90 TABLET | Refills: 0 | Status: SHIPPED | OUTPATIENT
Start: 2023-05-31

## 2023-08-14 NOTE — SIGNIFICANT NOTE
Education provided the Patient on the following:    - Nothing to Eat or Drink after MN the night before the procedure    - Avoid red/purple fluids while completing their bowel prep as ordered by physician  -Contact Gastrointerologist office for any questions about specific details regarding colon prep    -You will need to have someone drive you home after your colonoscopy and remain with you for 24 hours after the procedure  - The date of your Surgery, you may have one visitor at bedside or within 10-15 minutes of [facility]  -Please wear warm socks when you arrive for your colonoscopy  -Remove all jewelry and leave any valuables before arriving the day of your procedure (all will have to be removed before leaving preop)  -You will need to arrive at 0600 on 8/16 for your colonoscopy    -Feel free to contact us at: [phone] with any additional questions/concerns

## 2023-08-16 ENCOUNTER — ANESTHESIA EVENT (OUTPATIENT)
Dept: SURGERY | Facility: SURGERY CENTER | Age: 53
End: 2023-08-16
Payer: COMMERCIAL

## 2023-08-16 ENCOUNTER — ANESTHESIA (OUTPATIENT)
Dept: SURGERY | Facility: SURGERY CENTER | Age: 53
End: 2023-08-16
Payer: COMMERCIAL

## 2023-08-16 ENCOUNTER — PREP FOR SURGERY (OUTPATIENT)
Dept: SURGERY | Facility: SURGERY CENTER | Age: 53
End: 2023-08-16
Payer: COMMERCIAL

## 2023-08-16 ENCOUNTER — HOSPITAL ENCOUNTER (OUTPATIENT)
Facility: SURGERY CENTER | Age: 53
Setting detail: HOSPITAL OUTPATIENT SURGERY
Discharge: HOME | End: 2023-08-16
Attending: INTERNAL MEDICINE | Admitting: INTERNAL MEDICINE
Payer: COMMERCIAL

## 2023-08-16 VITALS
RESPIRATION RATE: 16 BRPM | BODY MASS INDEX: 23.73 KG/M2 | OXYGEN SATURATION: 96 % | HEART RATE: 84 BPM | WEIGHT: 139 LBS | TEMPERATURE: 98 F | DIASTOLIC BLOOD PRESSURE: 69 MMHG | SYSTOLIC BLOOD PRESSURE: 120 MMHG | HEIGHT: 64 IN

## 2023-08-16 DIAGNOSIS — Z83.71 FAMILY HISTORY OF COLONIC POLYPS: Chronic | ICD-10-CM

## 2023-08-16 DIAGNOSIS — Z12.11 ENCOUNTER FOR SCREENING FOR MALIGNANT NEOPLASM OF COLON: ICD-10-CM

## 2023-08-16 DIAGNOSIS — Z83.71 FAMILY HISTORY OF POLYPS IN THE COLON: ICD-10-CM

## 2023-08-16 DIAGNOSIS — Z86.010 HISTORY OF COLON POLYPS: Primary | Chronic | ICD-10-CM

## 2023-08-16 PROCEDURE — 88305 TISSUE EXAM BY PATHOLOGIST: CPT | Performed by: INTERNAL MEDICINE

## 2023-08-16 PROCEDURE — 0 LIDOCAINE 1 % SOLUTION: Performed by: ANESTHESIOLOGY

## 2023-08-16 PROCEDURE — 45385 COLONOSCOPY W/LESION REMOVAL: CPT | Performed by: INTERNAL MEDICINE

## 2023-08-16 PROCEDURE — 25010000002 PROPOFOL 10 MG/ML EMULSION: Performed by: ANESTHESIOLOGY

## 2023-08-16 RX ORDER — SODIUM CHLORIDE 0.9 % (FLUSH) 0.9 %
10 SYRINGE (ML) INJECTION AS NEEDED
Status: DISCONTINUED | OUTPATIENT
Start: 2023-08-16 | End: 2023-08-16 | Stop reason: HOSPADM

## 2023-08-16 RX ORDER — LIDOCAINE HYDROCHLORIDE 10 MG/ML
INJECTION, SOLUTION INFILTRATION; PERINEURAL AS NEEDED
Status: DISCONTINUED | OUTPATIENT
Start: 2023-08-16 | End: 2023-08-16 | Stop reason: SURG

## 2023-08-16 RX ORDER — SODIUM CHLORIDE, SODIUM LACTATE, POTASSIUM CHLORIDE, CALCIUM CHLORIDE 600; 310; 30; 20 MG/100ML; MG/100ML; MG/100ML; MG/100ML
1000 INJECTION, SOLUTION INTRAVENOUS CONTINUOUS
Status: DISCONTINUED | OUTPATIENT
Start: 2023-08-16 | End: 2023-08-16 | Stop reason: HOSPADM

## 2023-08-16 RX ORDER — LIDOCAINE HYDROCHLORIDE 10 MG/ML
0.5 INJECTION, SOLUTION INFILTRATION; PERINEURAL ONCE AS NEEDED
Status: DISCONTINUED | OUTPATIENT
Start: 2023-08-16 | End: 2023-08-16 | Stop reason: HOSPADM

## 2023-08-16 RX ORDER — MAGNESIUM HYDROXIDE 1200 MG/15ML
LIQUID ORAL AS NEEDED
Status: DISCONTINUED | OUTPATIENT
Start: 2023-08-16 | End: 2023-08-16 | Stop reason: HOSPADM

## 2023-08-16 RX ORDER — SODIUM CHLORIDE 0.9 % (FLUSH) 0.9 %
3 SYRINGE (ML) INJECTION EVERY 12 HOURS SCHEDULED
Status: DISCONTINUED | OUTPATIENT
Start: 2023-08-16 | End: 2023-08-16 | Stop reason: HOSPADM

## 2023-08-16 RX ORDER — SODIUM CHLORIDE, SODIUM LACTATE, POTASSIUM CHLORIDE, CALCIUM CHLORIDE 600; 310; 30; 20 MG/100ML; MG/100ML; MG/100ML; MG/100ML
30 INJECTION, SOLUTION INTRAVENOUS CONTINUOUS PRN
Status: DISCONTINUED | OUTPATIENT
Start: 2023-08-16 | End: 2023-08-16 | Stop reason: HOSPADM

## 2023-08-16 RX ADMIN — LIDOCAINE HYDROCHLORIDE 60 MG: 10 INJECTION, SOLUTION INFILTRATION; PERINEURAL at 07:34

## 2023-08-16 RX ADMIN — PROPOFOL INJECTABLE EMULSION 160 MCG/KG/MIN: 10 INJECTION, EMULSION INTRAVENOUS at 07:34

## 2023-08-16 RX ADMIN — SODIUM CHLORIDE, POTASSIUM CHLORIDE, SODIUM LACTATE AND CALCIUM CHLORIDE 1000 ML: 600; 310; 30; 20 INJECTION, SOLUTION INTRAVENOUS at 06:54

## 2023-08-16 NOTE — ANESTHESIA POSTPROCEDURE EVALUATION
Patient: Gustavo Mcclendon    Procedure Summary       Date: 08/16/23 Room / Location: [facility] OR [facility] / [facility]    Anesthesia Start: 0732 Anesthesia Stop: 0755    Procedure: COLONOSCOPY, POLYPECTOMY Diagnosis:       Encounter for screening for malignant neoplasm of colon      Family history of polyps in the colon      (Encounter for screening for malignant neoplasm of colon [Z12.11])      (Family history of polyps in the colon [Z83.71])    Surgeons: Александр Vásquez MD Provider: Sal White MD    Anesthesia Type: MAC ASA Status: 3            Anesthesia Type: MAC    Vitals  Vitals Value Taken Time   /69 08/16/23 0809   Temp 36.7 øC (98 øF) 08/16/23 0754   Pulse 84 08/16/23 0809   Resp 16 08/16/23 0809   SpO2 96 % 08/16/23 0809           Post Anesthesia Care and Evaluation    Patient location during evaluation: PACU  Patient participation: complete - patient participated  Level of consciousness: awake  Pain management: adequate    Airway patency: patent  Anesthetic complications: No anesthetic complications  PONV Status: none  Cardiovascular status: acceptable  Respiratory status: acceptable  Hydration status: acceptable    Comments: Patient seen and examined postoperatively; vital signs stable; SpO2 greater than or equal to 90%; cardiopulmonary status stable; nausea/vomiting adequately controlled; pain adequately controlled; no apparent anesthesia complications; patient discharged from anesthesia care when discharge criteria were met

## 2023-08-16 NOTE — H&P
No chief complaint on file.      HPI  Patient today for screening colonoscopy.  He has a history of adenomatous polyps in 2019.         Problem List:    Patient Active Problem List   Diagnosis    Benign essential hypertension    Hyperlipidemia    Impaired fasting glucose    Secondary hypothyroidism    Vitamin D deficiency    Therapeutic drug monitoring    Routine physical examination    ACE-inhibitor cough    Family history of colonic polyps    History of colon polyps, 2019--tubular adenoma x2    History of positive PPD    Microscopic hematuria    Occlusive coronary artery disease, 2022--S/P OFF PUMP CORONARY ARTERY BYPASS GRAFT X 4 (LIMA-LAD,ROLY-OM SVG-DX;SVG-PLB)    Hx of CABG,  2022--S/P OFF PUMP CORONARY ARTERY BYPASS GRAFT X 4 (LIMA-LAD,ROLY-OM SVG-DX;SVG-PLB)    Family history of premature coronary artery disease    Influenza A    Bronchitis       Medical History:    Past Medical History:   Diagnosis Date    ACE-inhibitor cough 2016--patient presents with a several month history of a dry cough related to the use of lisinopril.    Benign essential hypertension 2009--treatment for hypertension begun.    Family history of colonic polyps 2018    Patient's paternal grandfather and paternal uncle had a history of colon polyps.    Family history of premature coronary artery disease 2022    Mother  from coronary artery disease age 56.  Maternal cousin with premature coronary artery disease    History of colon polyps, 2019--tubular adenoma x2 2019    May 17, 2019--colonoscopy revealed a cecal polyp and a splenic flexure polyp.  Pathology returned tubular adenoma x2.    History of positive PPD 2019    Hx of CABG,  2022--S/P OFF PUMP CORONARY ARTERY BYPASS GRAFT X 4 (LIMA-LAD,ROLY-OM SVG-DX;SVG-PLB) 2022    Discharge Date 2022 Name Gustavo Ortizgrady  Time 9:00 AM MRN [id number]  Surgeon Dr.Hashmi Vega 1970  Admit  2022 10:07 AM [facility] [id number]  Translation Services: Not Required [employer] ID #: None Final Diagnoses: Stable condition S/P OFF PUMP CORONARY ARTERY BYPASS GRAFT X 4 (LIMA-LAD,ROLY-OM SVG-DX;SVG-PLB) INTRAOP LOCALIZATION OF VASCULAR CONDUIT,RIGHT ENDOVEIN HARVEST,GRAFT PATENCY     Hyperlipidemia 1997--treatment for hyperlipidemia begun.    Impaired fasting glucose 2011--initial hemoglobin A1c 6.3.  2011--initial diagnosis of impaired fasting glucose with a serum glucose of 111.    Microscopic hematuria 2020    Occlusive coronary artery disease, 2022--S/P OFF PUMP CORONARY ARTERY BYPASS GRAFT X 4 (LIMA-LAD,ROLY-OM SVG-DX;SVG-PLB) 2022    Discharge Date 2022 Name Gustavo Mcclendon  Time 9:00 AM MRN [id number]  Surgeon Dr.Hashmi Vega 1970  Admit 2022 10:07 AM [facility] [id number]  Translation Services: Not Required [employer] ID #: None Final Diagnoses: Stable condition S/P OFF PUMP CORONARY ARTERY BYPASS GRAFT X 4 (LIMA-LAD,ROLY-OM SVG-DX;SVG-PLB) INTRAOP LOCALIZATION OF VASCULAR CONDUIT,RIGHT ENDOVEIN HARVEST,GRAFT PATENCY     Secondary hypothyroidism 2014--correction: patient did have a contrasted MRI of the pituitary 2013 and it did not show any evidence of pituitary tumor.  2014--TSH is low at 0.363, normal range 0.45--4.5. T4 borderline at 4.5, normal range 4.5--12.0. Free thyroxine index normal at 1.7, normal range 1.2--4.9. Patient reports he has symptoms of fatigue as well as cold intolerance and constipation and would like to try supplementation. Levothyroxine 75 mcg per day initiated. Followup in 5 weeks with lab prior.   10/21/2013--patient was seen in consultation by the endocrinologist and repeat thyroid function tests were performed. 3 T3 was normal at 3.1. Thyroid antibodies were normal. TSH was low normal range at 0.452. Free T4 was normal at 1.36, normal range 0.8 to--1.77. Patient was felt to be euthyroid and  only needed continued observation.   2013--TSH was low at 0.390. Question secondary hypothyroid. Repeat thyroid function tests revealed TSH to be normal at 0.46, normal range 0.27--4.20. T3 was low at 78.    Vitamin D deficiency 3/28/2016        Social History:    Social History     Socioeconomic History    Marital status:      Spouse name: jose    Highest education level: Professional school degree (e.g., MD, DDS, DVM, RICARDA)   Tobacco Use    Smoking status: Never    Smokeless tobacco: Never   Vaping Use    Vaping Use: Never used   Substance and Sexual Activity    Alcohol use: No    Drug use: No    Sexual activity: Yes     Partners: Female       Family History:   Family History   Problem Relation Age of Onset    Coronary artery disease Mother         Mother  at age 56 from coronary artery disease    Diabetes Mother     Hypertension Brother     Coronary artery disease Cousin         Mother sister and son with premature coronary artery disease, status post CABG       Surgical History:   Past Surgical History:   Procedure Laterality Date    COLONOSCOPY  2019    May 17, 2019--colonoscopy revealed a cecal polyp and a splenic flexure polyp.  Pathology returned tubular adenoma x2.    DENTAL PROCEDURE  2012--dental implant.         Current Facility-Administered Medications:     lactated ringers infusion 1,000 mL, 1,000 mL, Intravenous, Continuous, Александр Vásquez MD, Last Rate: 25 mL/hr at 23 0654, 1,000 mL at 23 0654    lidocaine (XYLOCAINE) 1 % injection 0.5 mL, 0.5 mL, Intradermal, Once PRN, Александр Vásquez MD    sodium chloride 0.9 % flush 10 mL, 10 mL, Intravenous, PRN, Александр Vásquez MD    sterile water irrigation solution, , , PRN, Александр Vásquez MD, 1,000 mL at 23 0710    Allergies: No Known Allergies     The following portions of the patient's history were reviewed by me and updated as appropriate: review of systems, allergies, current  medications, past family history, past medical history, past social history, past surgical history and problem list.    Vitals:    08/16/23 0648   BP: 118/67   Pulse: 66   Resp: 16   Temp: 97 øF (36.1 øC)   SpO2: 100%       PHYSICAL EXAM:    CONSTITUTIONAL:  today's vital signs reviewed by me  GASTROINTESTINAL: abdomen is soft nontender nondistended with normal active bowel sounds, no masses are appreciated    Assessment/ Plan  We will proceed today with colonoscopy.    Risks and benefits as well as alternatives to endoscopic evaluation were explained to the patient and they voiced understanding and wish to proceed.  These risks include but are not limited to the risk of bleeding, perforation, adverse reaction to sedation, and missed lesions.  The patient was given the opportunity to ask questions prior to the endoscopic procedure.

## 2023-08-16 NOTE — ANESTHESIA PREPROCEDURE EVALUATION
Anesthesia Evaluation     NPO Solid Status: > 8 hours  NPO Liquid Status: > 8 hours           Airway   Mallampati: I  No difficulty expected  Dental      Pulmonary    Cardiovascular   Exercise tolerance: good (4-7 METS)    (+) hypertension, CAD, CABGhyperlipidemia      Neuro/Psych  GI/Hepatic/Renal/Endo    (+) thyroid problem     Musculoskeletal     Abdominal    Substance History      OB/GYN          Other                      Anesthesia Plan    ASA 3     MAC     intravenous induction     Anesthetic plan, risks, benefits, and alternatives have been provided, discussed and informed consent has been obtained with: patient.    CODE STATUS:

## 2023-08-17 LAB
LAB AP CASE REPORT: NORMAL
LAB AP CLINICAL INFORMATION: NORMAL
PATH REPORT.FINAL DX SPEC: NORMAL
PATH REPORT.GROSS SPEC: NORMAL

## 2023-09-01 DIAGNOSIS — I10 BENIGN ESSENTIAL HYPERTENSION: Chronic | ICD-10-CM

## 2023-09-01 DIAGNOSIS — E78.2 MIXED HYPERLIPIDEMIA: Chronic | ICD-10-CM

## 2023-09-01 RX ORDER — IRBESARTAN 150 MG/1
TABLET ORAL
Qty: 30 TABLET | Refills: 0 | Status: SHIPPED | OUTPATIENT
Start: 2023-09-01

## 2023-09-01 RX ORDER — ROSUVASTATIN CALCIUM 40 MG/1
TABLET, COATED ORAL
Qty: 30 TABLET | Refills: 0 | Status: SHIPPED | OUTPATIENT
Start: 2023-09-01

## 2023-09-01 NOTE — TELEPHONE ENCOUNTER
Rx Refill Note  Requested Prescriptions     Pending Prescriptions Disp Refills    rosuvastatin (CRESTOR) 40 MG tablet [Pharmacy Med Name: ROSUVASTATIN CALCIUM 40 MG TAB] 30 tablet 0     Sig: TAKE 1 TABLET BY MOUTH EVERY DAY FOR CHOLESTEROL    irbesartan (AVAPRO) 150 MG tablet [Pharmacy Med Name: IRBESARTAN 150 MG TABLET] 30 tablet 0     Sig: TAKE 1 TABLET BY MOUTH EVERY DAY FOR HIGH BLOOD PRESSURE      Last office visit with prescribing clinician: 9/6/2022   Last telemedicine visit with prescribing clinician: Visit date not found   Next office visit with prescribing clinician: Visit date not found                         Would you like a call back once the refill request has been completed: [] Yes [] No    If the office needs to give you a call back, can they leave a voicemail: [] Yes [] No    Dalia Simmons MA  09/01/23, 11:07 EDT

## 2023-09-20 ENCOUNTER — TELEPHONE (OUTPATIENT)
Dept: INTERNAL MEDICINE | Facility: CLINIC | Age: 53
End: 2023-09-20

## 2023-09-20 NOTE — TELEPHONE ENCOUNTER
"Caller: Gustavo Mcclendon \"[employer]\"    Relationship: Self    Best call back number: [phone]    What orders are you requesting (i.e. lab or imaging): BLOOD WORK.     In what timeframe would the patient need to come in: BEFORE 10/27/23 PHYSICAL.    Where will you receive your lab/imaging services:     Additional notes: PLEASE CONTACT PATIENT TO ADVISE.          THANKS   "

## 2023-09-21 DIAGNOSIS — E03.8 SECONDARY HYPOTHYROIDISM: ICD-10-CM

## 2023-09-21 DIAGNOSIS — Z00.00 ROUTINE PHYSICAL EXAMINATION: ICD-10-CM

## 2023-09-21 DIAGNOSIS — E55.9 VITAMIN D DEFICIENCY: ICD-10-CM

## 2023-09-21 DIAGNOSIS — Z51.81 THERAPEUTIC DRUG MONITORING: ICD-10-CM

## 2023-09-21 DIAGNOSIS — E78.2 MIXED HYPERLIPIDEMIA: Primary | ICD-10-CM

## 2023-09-21 DIAGNOSIS — R73.01 IMPAIRED FASTING GLUCOSE: ICD-10-CM

## 2023-09-21 DIAGNOSIS — R31.29 MICROSCOPIC HEMATURIA: ICD-10-CM

## 2023-10-09 DIAGNOSIS — I10 BENIGN ESSENTIAL HYPERTENSION: Chronic | ICD-10-CM

## 2023-10-09 DIAGNOSIS — E78.2 MIXED HYPERLIPIDEMIA: Chronic | ICD-10-CM

## 2023-10-09 RX ORDER — IRBESARTAN 150 MG/1
TABLET ORAL
Qty: 30 TABLET | Refills: 0 | Status: SHIPPED | OUTPATIENT
Start: 2023-10-09

## 2023-10-09 RX ORDER — ROSUVASTATIN CALCIUM 40 MG/1
TABLET, COATED ORAL
Qty: 30 TABLET | Refills: 0 | Status: SHIPPED | OUTPATIENT
Start: 2023-10-09

## 2023-10-27 ENCOUNTER — LAB (OUTPATIENT)
Dept: LAB | Facility: HOSPITAL | Age: 53
End: 2023-10-27
Payer: COMMERCIAL

## 2023-10-27 ENCOUNTER — OFFICE VISIT (OUTPATIENT)
Dept: INTERNAL MEDICINE | Facility: CLINIC | Age: 53
End: 2023-10-27
Payer: COMMERCIAL

## 2023-10-27 VITALS
WEIGHT: 144 LBS | BODY MASS INDEX: 24.59 KG/M2 | HEIGHT: 64 IN | OXYGEN SATURATION: 99 % | TEMPERATURE: 98 F | HEART RATE: 80 BPM | DIASTOLIC BLOOD PRESSURE: 74 MMHG | SYSTOLIC BLOOD PRESSURE: 120 MMHG

## 2023-10-27 DIAGNOSIS — Z86.010 HISTORY OF COLON POLYPS: Chronic | ICD-10-CM

## 2023-10-27 DIAGNOSIS — Z92.89 HISTORY OF POSITIVE PPD: Chronic | ICD-10-CM

## 2023-10-27 DIAGNOSIS — I25.10 OCCLUSIVE CORONARY ARTERY DISEASE: Chronic | ICD-10-CM

## 2023-10-27 DIAGNOSIS — R31.29 MICROSCOPIC HEMATURIA: Chronic | ICD-10-CM

## 2023-10-27 DIAGNOSIS — I10 BENIGN ESSENTIAL HYPERTENSION: Chronic | ICD-10-CM

## 2023-10-27 DIAGNOSIS — Z23 NEED FOR INFLUENZA VACCINATION: ICD-10-CM

## 2023-10-27 DIAGNOSIS — R73.01 IMPAIRED FASTING GLUCOSE: Chronic | ICD-10-CM

## 2023-10-27 DIAGNOSIS — E55.9 VITAMIN D DEFICIENCY: Chronic | ICD-10-CM

## 2023-10-27 DIAGNOSIS — Z51.81 THERAPEUTIC DRUG MONITORING: ICD-10-CM

## 2023-10-27 DIAGNOSIS — E03.8 SECONDARY HYPOTHYROIDISM: Chronic | ICD-10-CM

## 2023-10-27 DIAGNOSIS — E78.2 MIXED HYPERLIPIDEMIA: Chronic | ICD-10-CM

## 2023-10-27 DIAGNOSIS — Z95.1 HX OF CABG: Chronic | ICD-10-CM

## 2023-10-27 DIAGNOSIS — Z00.00 ROUTINE PHYSICAL EXAMINATION: Primary | ICD-10-CM

## 2023-10-27 PROBLEM — J10.1 INFLUENZA A: Status: RESOLVED | Noted: 2023-01-19 | Resolved: 2023-10-27

## 2023-10-27 PROBLEM — J40 BRONCHITIS: Status: RESOLVED | Noted: 2023-01-19 | Resolved: 2023-10-27

## 2023-10-27 PROCEDURE — 84439 ASSAY OF FREE THYROXINE: CPT | Performed by: INTERNAL MEDICINE

## 2023-10-27 PROCEDURE — 80053 COMPREHEN METABOLIC PANEL: CPT | Performed by: INTERNAL MEDICINE

## 2023-10-27 PROCEDURE — 85027 COMPLETE CBC AUTOMATED: CPT | Performed by: INTERNAL MEDICINE

## 2023-10-27 PROCEDURE — 83036 HEMOGLOBIN GLYCOSYLATED A1C: CPT | Performed by: INTERNAL MEDICINE

## 2023-10-27 PROCEDURE — 83704 LIPOPROTEIN BLD QUAN PART: CPT | Performed by: INTERNAL MEDICINE

## 2023-10-27 PROCEDURE — 82550 ASSAY OF CK (CPK): CPT | Performed by: INTERNAL MEDICINE

## 2023-10-27 PROCEDURE — 84479 ASSAY OF THYROID (T3 OR T4): CPT | Performed by: INTERNAL MEDICINE

## 2023-10-27 PROCEDURE — 84481 FREE ASSAY (FT-3): CPT | Performed by: INTERNAL MEDICINE

## 2023-10-27 PROCEDURE — 82306 VITAMIN D 25 HYDROXY: CPT | Performed by: INTERNAL MEDICINE

## 2023-10-27 PROCEDURE — 84443 ASSAY THYROID STIM HORMONE: CPT | Performed by: INTERNAL MEDICINE

## 2023-10-27 PROCEDURE — 99396 PREV VISIT EST AGE 40-64: CPT | Performed by: INTERNAL MEDICINE

## 2023-10-27 PROCEDURE — 84153 ASSAY OF PSA TOTAL: CPT | Performed by: INTERNAL MEDICINE

## 2023-10-27 PROCEDURE — 80061 LIPID PANEL: CPT | Performed by: INTERNAL MEDICINE

## 2023-10-27 RX ORDER — METOPROLOL SUCCINATE 50 MG/1
TABLET, EXTENDED RELEASE ORAL
Qty: 90 TABLET | Refills: 3 | Status: SHIPPED | OUTPATIENT
Start: 2023-10-27

## 2023-10-27 RX ORDER — LEVOTHYROXINE SODIUM 0.07 MG/1
TABLET ORAL
Qty: 90 TABLET | Refills: 3 | Status: SHIPPED | OUTPATIENT
Start: 2023-10-27

## 2023-10-27 RX ORDER — IRBESARTAN 150 MG/1
TABLET ORAL
Qty: 90 TABLET | Refills: 3 | Status: SHIPPED | OUTPATIENT
Start: 2023-10-27

## 2023-10-27 RX ORDER — EZETIMIBE 10 MG/1
TABLET ORAL
Qty: 90 TABLET | Refills: 3 | Status: SHIPPED | OUTPATIENT
Start: 2023-10-27

## 2023-10-27 RX ORDER — ROSUVASTATIN CALCIUM 40 MG/1
TABLET, COATED ORAL
Qty: 90 TABLET | Refills: 3 | Status: SHIPPED | OUTPATIENT
Start: 2023-10-27

## 2023-10-27 NOTE — PROGRESS NOTES
10/27/2023    Patient Information  Gustavo Mcclendon                                                                                          [id number] [facility] [address]      1970  [unfilled]  There is no work phone number on file.    Chief Complaint:     Routine annual physical examination/preventative visit.  No new acute complaints.    History of Present Illness:    Patient with a history of several chronic medical issues as noted below in the assessment and plan presents today for his routine annual physical/preventative visit.  His past medical history reviewed and updated were necessary including health maintenance parameters.  This reveals he may need influenza vaccine and Shingrix vaccine.  See below.    Review of Systems   Constitutional: Negative.   HENT: Negative.     Eyes: Negative.    Cardiovascular: Negative.    Respiratory: Negative.     Endocrine: Negative.    Hematologic/Lymphatic: Negative.    Skin: Negative.    Musculoskeletal: Negative.    Gastrointestinal: Negative.    Genitourinary: Negative.    Neurological: Negative.    Psychiatric/Behavioral: Negative.     Allergic/Immunologic: Negative.        Active Problems:    Patient Active Problem List   Diagnosis    Benign essential hypertension    Hyperlipidemia    Impaired fasting glucose    Secondary hypothyroidism    Vitamin D deficiency    Therapeutic drug monitoring    Routine physical examination    ACE-inhibitor cough    Family history of colonic polyps    History of colon polyps, 5/17/2019--tubular adenoma x2    History of positive PPD    Microscopic hematuria    Occlusive coronary artery disease, 6/17/2022--S/P OFF PUMP CORONARY ARTERY BYPASS GRAFT X 4 (LIMA-LAD,ROLY-OM SVG-DX;SVG-PLB)    Hx of CABG,  6/17/2022--S/P OFF PUMP CORONARY ARTERY BYPASS GRAFT X 4 (LIMA-LAD,ROLY-OM SVG-DX;SVG-PLB)    Family history of premature coronary artery disease         Past Medical History:   Diagnosis Date    ACE-inhibitor  cough 2016--patient presents with a several month history of a dry cough related to the use of lisinopril.    Benign essential hypertension 2009--treatment for hypertension begun.    Family history of colonic polyps 2018    Patient's paternal grandfather and paternal uncle had a history of colon polyps.    Family history of premature coronary artery disease 2022    Mother  from coronary artery disease age 56.  Maternal cousin with premature coronary artery disease    History of colon polyps, 2019--tubular adenoma x2 2019    May 17, 2019--colonoscopy revealed a cecal polyp and a splenic flexure polyp.  Pathology returned tubular adenoma x2.    History of positive PPD 2019    Hx of CABG,  2022--S/P OFF PUMP CORONARY ARTERY BYPASS GRAFT X 4 (LIMA-LAD,ROLY-OM SVG-DX;SVG-PLB) 2022    Discharge Date 2022 Name Gustavo Mcclendon  Time 9:00 AM MRJEREMIE [id number]  Surgeon Dr.Hashmi Vega 1970  Admit 2022 10:07 AM CSN [id number]  Translation Services: Not Required [facility] ID #: None Final Diagnoses: Stable condition S/P OFF PUMP CORONARY ARTERY BYPASS GRAFT X 4 (LIMA-LAD,ROLY-OM SVG-DX;SVG-PLB) INTRAOP LOCALIZATION OF VASCULAR CONDUIT,RIGHT ENDOVEIN HARVEST,GRAFT PATENCY     Hyperlipidemia 1997--treatment for hyperlipidemia begun.    Impaired fasting glucose 2011--initial hemoglobin A1c 6.3.  2011--initial diagnosis of impaired fasting glucose with a serum glucose of 111.    Microscopic hematuria 2020    Occlusive coronary artery disease, 2022--S/P OFF PUMP CORONARY ARTERY BYPASS GRAFT X 4 (LIMA-LAD,ROLY-OM SVG-DX;SVG-PLB) 2022    Discharge Date 2022 Name Gustavo Mcclendon  Time 9:00 AM FARAZ [id number]  Surgeon Dr.Hashmi Vega 1970  Admit 2022 10:07 AM CSN [id number]  Translation Services: Not Required [employer] ID #: None Final Diagnoses: Stable condition S/P OFF PUMP CORONARY  ARTERY BYPASS GRAFT X 4 (LIMA-LAD,ROLY-OM SVG-DX;SVG-PLB) INTRAOP LOCALIZATION OF VASCULAR CONDUIT,RIGHT ENDOVEIN HARVEST,GRAFT PATENCY     Secondary hypothyroidism 5/31/2013 04/25/2014--correction: patient did have a contrasted MRI of the pituitary 08/19/2013 and it did not show any evidence of pituitary tumor.  04/19/2014--TSH is low at 0.363, normal range 0.45--4.5. T4 borderline at 4.5, normal range 4.5--12.0. Free thyroxine index normal at 1.7, normal range 1.2--4.9. Patient reports he has symptoms of fatigue as well as cold intolerance and constipation and would like to try supplementation. Levothyroxine 75 mcg per day initiated. Followup in 5 weeks with lab prior.   10/21/2013--patient was seen in consultation by the endocrinologist and repeat thyroid function tests were performed. 3 T3 was normal at 3.1. Thyroid antibodies were normal. TSH was low normal range at 0.452. Free T4 was normal at 1.36, normal range 0.8 to--1.77. Patient was felt to be euthyroid and only needed continued observation.   05/31/2013--TSH was low at 0.390. Question secondary hypothyroid. Repeat thyroid function tests revealed TSH to be normal at 0.46, normal range 0.27--4.20. T3 was low at 78.    Vitamin D deficiency 3/28/2016         Past Surgical History:   Procedure Laterality Date    COLONOSCOPY  05/17/2019    May 17, 2019--colonoscopy revealed a cecal polyp and a splenic flexure polyp.  Pathology returned tubular adenoma x2.    COLONOSCOPY N/A 8/16/2023    Procedure: COLONOSCOPY, POLYPECTOMY;  Surgeon: Александр Vásquez MD;  Location: [facility] MAIN OR;  Service: Gastroenterology;  Laterality: N/A;  POLYP X 1    DENTAL PROCEDURE  2012 2012--dental implant.         No Known Allergies        Current Outpatient Medications:     aspirin 81 MG tablet, Take 1 tablet by mouth Daily., Disp: , Rfl:     Cholecalciferol (VITAMIN D3) 5000 UNITS capsule capsule, Take 1 capsule by mouth Daily., Disp: , Rfl:     ezetimibe (ZETIA) 10 MG  "tablet, TAKE 1 TABLET BY MOUTH DAILY FOR HIGH CHOLESTEROL, Disp: 90 tablet, Rfl: 3    irbesartan (AVAPRO) 150 MG tablet, Take 1 p.o. every morning for high blood pressure, Disp: 90 tablet, Rfl: 3    levothyroxine (SYNTHROID, LEVOTHROID) 75 MCG tablet, TAKE 1 TABLET BY MOUTH DAILY FOR LOW THYROID, Disp: 90 tablet, Rfl: 3    metoprolol succinate XL (TOPROL-XL) 50 MG 24 hr tablet, Take 1 p.o. daily for heart and blood pressure, Disp: 90 tablet, Rfl: 3    rosuvastatin (CRESTOR) 40 MG tablet, TAKE 1 TABLET BY MOUTH EVERY DAY FOR CHOLESTEROL, Disp: 90 tablet, Rfl: 3      Family History   Problem Relation Age of Onset    Coronary artery disease Mother         Mother  at age 56 from coronary artery disease    Diabetes Mother     Hypertension Brother     Coronary artery disease Cousin         Mother sister and son with premature coronary artery disease, status post CABG         Social History     Socioeconomic History    Marital status:      Spouse name: jose    Highest education level: Professional school degree (e.g., MD, DDS, DVM, RICARDA)   Tobacco Use    Smoking status: Never    Smokeless tobacco: Never   Vaping Use    Vaping Use: Never used   Substance and Sexual Activity    Alcohol use: No    Drug use: No    Sexual activity: Yes     Partners: Female         Vitals:    10/27/23 1430   BP: 120/74   Pulse: 80   Temp: 98 °F (36.7 °C)   SpO2: 99%   Weight: 65.3 kg (144 lb)   Height: 162.6 cm (64.02\")        Body mass index is 24.71 kg/m².      Physical Exam:    General: Alert and oriented x 3.  No acute distress.  Normal affect.  HEENT: Pupils equal, round, reactive to light; extraocular movements intact; sclerae nonicteric; pharynx, ear canals and TMs normal.  Neck: Without JVD, thyromegaly, bruit, or adenopathy.  Lungs: Clear to auscultation in all fields.  Heart: Regular rate and rhythm without murmur, rub, gallop, or click.  Abdomen: Soft, nontender, without hepatosplenomegaly or hernia.  Bowel sounds normal.  " : Deferred.  Rectal: Deferred.  Extremities: Without clubbing, cyanosis, edema, or pulse deficit.  Neurologic: Intact without focal deficit.  Normal station and gait observed during ingress and egress from the examination room.  Skin: Without significant lesion.  Musculoskeletal: Unremarkable.    Lab/other results:    Lab work was just drawn today.  Results not available.  See below.    Assessment/Plan:     Diagnosis Plan   1. Routine physical examination  CBC (No Diff)    CK    Comprehensive Metabolic Panel    Hemoglobin A1c    NMR LipoProfile    TSH    T4, Free    T3, Free    PSA DIAGNOSTIC    Urinalysis With Microscopic If Indicated (No Culture) - Urine, Clean Catch    Vitamin D,25-Hydroxy      2. Impaired fasting glucose  Comprehensive Metabolic Panel    Hemoglobin A1c    Urinalysis With Microscopic If Indicated (No Culture) - Urine, Clean Catch      3. Hyperlipidemia  ezetimibe (ZETIA) 10 MG tablet    rosuvastatin (CRESTOR) 40 MG tablet    CK    Comprehensive Metabolic Panel    NMR LipoProfile      4. Occlusive coronary artery disease, 6/17/2022--S/P OFF PUMP CORONARY ARTERY BYPASS GRAFT X 4 (LIMA-LAD,ROLY-OM SVG-DX;SVG-PLB)  metoprolol succinate XL (TOPROL-XL) 50 MG 24 hr tablet      5. Secondary hypothyroidism  levothyroxine (SYNTHROID, LEVOTHROID) 75 MCG tablet    TSH    T4, Free    T3, Free      6. Vitamin D deficiency  Vitamin D,25-Hydroxy      7. Microscopic hematuria        8. Hx of CABG,  6/17/2022--S/P OFF PUMP CORONARY ARTERY BYPASS GRAFT X 4 (LIMA-LAD,ROLY-OM SVG-DX;SVG-PLB)        9. History of positive PPD        10. History of colon polyps, 5/17/2019--tubular adenoma x2        11. Benign essential hypertension  metoprolol succinate XL (TOPROL-XL) 50 MG 24 hr tablet    irbesartan (AVAPRO) 150 MG tablet    Comprehensive Metabolic Panel      12. Therapeutic drug monitoring  CBC (No Diff)    T3, Free      13. Need for influenza vaccination          Patient presents for his routine annual  physical/preventative visit and seems to be doing fairly well.  He has several chronic medical issues as listed above that can be better assessed once we have his lab work results available.    Several preventative health issues discussed including review of vaccinations and recommendations, including dietary issues, exercise and weight loss.  Safe sex practices discussed.  Patient advised to wear seatbelt whenever driving and avoid texting and driving.  Also advised to look both ways before crossing the street.  Colon cancer prevention discussed and is up-to-date with colonoscopy.  Advised to avoid tobacco products and minimize alcohol consumption.    Plan is as follows: Recommend influenza vaccine and Shingrix vaccine.  Consider RSV vaccine.  Work on diet and exercise.  No change in current medical regimen.  I will contact patient with the results of his lab work and possible further instructions as soon as they are available.  Follow-up in 1 year with lab prior for his annual physical.  Otherwise follow-up as needed.    Addendum: Some lab work results came back today as follows: CBC is normal except hemoglobin slightly low at 12.7.  CPK normal.  CMP normal.  Hemoglobin A1c 5.8.  NMR profile is pending.  Vitamin D normal at 63.5.  TSH is slightly low at 0.155 but free T3 and free T4 are normal.  PSA normal at 0.737.  These findings were discussed with patient today.      Procedures

## 2023-11-24 DIAGNOSIS — E78.2 MIXED HYPERLIPIDEMIA: Chronic | ICD-10-CM

## 2023-11-24 DIAGNOSIS — I10 BENIGN ESSENTIAL HYPERTENSION: Chronic | ICD-10-CM

## 2023-11-27 RX ORDER — EZETIMIBE 10 MG/1
TABLET ORAL
Qty: 90 TABLET | Refills: 1 | Status: SHIPPED | OUTPATIENT
Start: 2023-11-27

## 2023-11-27 RX ORDER — ROSUVASTATIN CALCIUM 40 MG/1
TABLET, COATED ORAL
Qty: 90 TABLET | Refills: 1 | Status: SHIPPED | OUTPATIENT
Start: 2023-11-27

## 2023-11-27 RX ORDER — IRBESARTAN 150 MG/1
TABLET ORAL
Qty: 90 TABLET | Refills: 1 | Status: SHIPPED | OUTPATIENT
Start: 2023-11-27

## 2024-05-17 DIAGNOSIS — E78.2 MIXED HYPERLIPIDEMIA: Chronic | ICD-10-CM

## 2024-05-17 RX ORDER — ROSUVASTATIN CALCIUM 40 MG/1
TABLET, COATED ORAL
Qty: 90 TABLET | Refills: 1 | Status: SHIPPED | OUTPATIENT
Start: 2024-05-17

## 2024-06-21 DIAGNOSIS — I10 BENIGN ESSENTIAL HYPERTENSION: Chronic | ICD-10-CM

## 2024-06-21 DIAGNOSIS — E78.2 MIXED HYPERLIPIDEMIA: Chronic | ICD-10-CM

## 2024-06-24 RX ORDER — IRBESARTAN 150 MG/1
TABLET ORAL
Qty: 90 TABLET | Refills: 1 | Status: SHIPPED | OUTPATIENT
Start: 2024-06-24

## 2024-06-24 RX ORDER — EZETIMIBE 10 MG/1
TABLET ORAL
Qty: 90 TABLET | Refills: 1 | Status: SHIPPED | OUTPATIENT
Start: 2024-06-24

## 2024-08-24 DIAGNOSIS — E78.2 MIXED HYPERLIPIDEMIA: Chronic | ICD-10-CM

## 2024-08-24 DIAGNOSIS — E03.8 SECONDARY HYPOTHYROIDISM: Chronic | ICD-10-CM

## 2024-08-26 RX ORDER — LEVOTHYROXINE SODIUM 75 UG/1
TABLET ORAL
Qty: 90 TABLET | Refills: 3 | Status: SHIPPED | OUTPATIENT
Start: 2024-08-26

## 2024-08-26 RX ORDER — ROSUVASTATIN CALCIUM 40 MG/1
TABLET, COATED ORAL
Qty: 90 TABLET | Refills: 1 | Status: SHIPPED | OUTPATIENT
Start: 2024-08-26

## 2024-08-29 ENCOUNTER — PATIENT MESSAGE (OUTPATIENT)
Dept: INTERNAL MEDICINE | Facility: CLINIC | Age: 54
End: 2024-08-29
Payer: COMMERCIAL

## 2024-08-29 DIAGNOSIS — I10 BENIGN ESSENTIAL HYPERTENSION: Chronic | ICD-10-CM

## 2024-08-29 RX ORDER — IRBESARTAN 150 MG/1
TABLET ORAL
Qty: 90 TABLET | Refills: 1 | Status: SHIPPED | OUTPATIENT
Start: 2024-08-29

## 2024-08-29 NOTE — TELEPHONE ENCOUNTER
From: Gustavo Mcclendon  To: Christian Dorsey  Sent: 8/29/2024 1:14 PM EDT  Subject: Irbesartan    Hi Doc,  I am completely out of Irbesartan. Can you please send a 90 day supply to [facility] in my records? Please send 2-3 refills also.    Thanks  Dr. Mcclendon

## 2024-10-18 ENCOUNTER — LAB (OUTPATIENT)
Dept: LAB | Facility: HOSPITAL | Age: 54
End: 2024-10-18
Payer: COMMERCIAL

## 2024-10-18 DIAGNOSIS — E78.2 MIXED HYPERLIPIDEMIA: Primary | Chronic | ICD-10-CM

## 2024-10-18 DIAGNOSIS — E03.8 SECONDARY HYPOTHYROIDISM: Chronic | ICD-10-CM

## 2024-10-18 DIAGNOSIS — I10 BENIGN ESSENTIAL HYPERTENSION: Chronic | ICD-10-CM

## 2024-10-18 DIAGNOSIS — E78.2 MIXED HYPERLIPIDEMIA: ICD-10-CM

## 2024-10-18 DIAGNOSIS — Z51.81 THERAPEUTIC DRUG MONITORING: ICD-10-CM

## 2024-10-18 DIAGNOSIS — Z00.00 ROUTINE PHYSICAL EXAMINATION: ICD-10-CM

## 2024-10-18 DIAGNOSIS — R73.01 IMPAIRED FASTING GLUCOSE: Chronic | ICD-10-CM

## 2024-10-18 DIAGNOSIS — E55.9 VITAMIN D DEFICIENCY: Chronic | ICD-10-CM

## 2024-10-18 LAB
25(OH)D3 SERPL-MCNC: 71.7 NG/ML (ref 30–100)
ALBUMIN SERPL-MCNC: 3.9 G/DL (ref 3.5–5.2)
ALBUMIN/GLOB SERPL: 1.5 G/DL
ALP SERPL-CCNC: 83 U/L (ref 39–117)
ALT SERPL W P-5'-P-CCNC: 13 U/L (ref 1–41)
ANION GAP SERPL CALCULATED.3IONS-SCNC: 10.8 MMOL/L (ref 5–15)
AST SERPL-CCNC: 20 U/L (ref 1–40)
BILIRUB SERPL-MCNC: 0.3 MG/DL (ref 0–1.2)
BILIRUB UR QL STRIP: NEGATIVE
BUN SERPL-MCNC: 18 MG/DL (ref 6–20)
BUN/CREAT SERPL: 17.5 (ref 7–25)
CALCIUM SPEC-SCNC: 9.2 MG/DL (ref 8.6–10.5)
CHLORIDE SERPL-SCNC: 101 MMOL/L (ref 98–107)
CK SERPL-CCNC: 428 U/L (ref 20–200)
CLARITY UR: CLEAR
CO2 SERPL-SCNC: 25.2 MMOL/L (ref 22–29)
COLOR UR: YELLOW
CREAT SERPL-MCNC: 1.03 MG/DL (ref 0.76–1.27)
DEPRECATED RDW RBC AUTO: 38.3 FL (ref 37–54)
EGFRCR SERPLBLD CKD-EPI 2021: 85.8 ML/MIN/1.73
ERYTHROCYTE [DISTWIDTH] IN BLOOD BY AUTOMATED COUNT: 12.1 % (ref 12.3–15.4)
GLOBULIN UR ELPH-MCNC: 2.6 GM/DL
GLUCOSE SERPL-MCNC: 110 MG/DL (ref 65–99)
GLUCOSE UR STRIP-MCNC: NEGATIVE MG/DL
HBA1C MFR BLD: 5.8 % (ref 4.8–5.6)
HCT VFR BLD AUTO: 39.6 % (ref 37.5–51)
HGB BLD-MCNC: 12.9 G/DL (ref 13–17.7)
HGB UR QL STRIP.AUTO: NEGATIVE
KETONES UR QL STRIP: NEGATIVE
LEUKOCYTE ESTERASE UR QL STRIP.AUTO: NEGATIVE
MCH RBC QN AUTO: 28.2 PG (ref 26.6–33)
MCHC RBC AUTO-ENTMCNC: 32.6 G/DL (ref 31.5–35.7)
MCV RBC AUTO: 86.7 FL (ref 79–97)
NITRITE UR QL STRIP: NEGATIVE
PH UR STRIP.AUTO: 6.5 [PH] (ref 5–8)
PLATELET # BLD AUTO: 235 10*3/MM3 (ref 140–450)
PMV BLD AUTO: 11.1 FL (ref 6–12)
POTASSIUM SERPL-SCNC: 4.2 MMOL/L (ref 3.5–5.2)
PROT SERPL-MCNC: 6.5 G/DL (ref 6–8.5)
PROT UR QL STRIP: NEGATIVE
PSA SERPL-MCNC: 1.43 NG/ML (ref 0–4)
RBC # BLD AUTO: 4.57 10*6/MM3 (ref 4.14–5.8)
SODIUM SERPL-SCNC: 137 MMOL/L (ref 136–145)
SP GR UR STRIP: 1.02 (ref 1–1.03)
T3FREE SERPL-MCNC: 2.62 PG/ML (ref 2–4.4)
T4 FREE SERPL-MCNC: 1.56 NG/DL (ref 0.92–1.68)
TSH SERPL DL<=0.05 MIU/L-ACNC: 0.39 UIU/ML (ref 0.27–4.2)
UROBILINOGEN UR QL STRIP: NORMAL
WBC NRBC COR # BLD AUTO: 4.57 10*3/MM3 (ref 3.4–10.8)

## 2024-10-18 PROCEDURE — 84153 ASSAY OF PSA TOTAL: CPT

## 2024-10-18 PROCEDURE — 82306 VITAMIN D 25 HYDROXY: CPT

## 2024-10-18 PROCEDURE — 36415 COLL VENOUS BLD VENIPUNCTURE: CPT

## 2024-10-18 PROCEDURE — 81003 URINALYSIS AUTO W/O SCOPE: CPT

## 2024-10-18 PROCEDURE — 85027 COMPLETE CBC AUTOMATED: CPT

## 2024-10-18 PROCEDURE — 84443 ASSAY THYROID STIM HORMONE: CPT

## 2024-10-18 PROCEDURE — 83704 LIPOPROTEIN BLD QUAN PART: CPT

## 2024-10-18 PROCEDURE — 84439 ASSAY OF FREE THYROXINE: CPT

## 2024-10-18 PROCEDURE — 82550 ASSAY OF CK (CPK): CPT

## 2024-10-18 PROCEDURE — 80061 LIPID PANEL: CPT

## 2024-10-18 PROCEDURE — 83695 ASSAY OF LIPOPROTEIN(A): CPT

## 2024-10-18 PROCEDURE — 80053 COMPREHEN METABOLIC PANEL: CPT

## 2024-10-18 PROCEDURE — 84481 FREE ASSAY (FT-3): CPT

## 2024-10-18 PROCEDURE — 83036 HEMOGLOBIN GLYCOSYLATED A1C: CPT

## 2024-10-21 LAB
CHOLEST SERPL-MCNC: 157 MG/DL (ref 100–199)
HDL SERPL-SCNC: 31.1 UMOL/L
HDLC SERPL-MCNC: 47 MG/DL
LDL SERPL QN: 20.9 NM
LDL SERPL-SCNC: 1391 NMOL/L
LDL SMALL SERPL-SCNC: 584 NMOL/L
LDLC SERPL CALC-MCNC: 98 MG/DL (ref 0–99)
LPA SERPL-SCNC: 56.2 NMOL/L
TRIGL SERPL-MCNC: 57 MG/DL (ref 0–149)

## 2024-11-01 ENCOUNTER — OFFICE VISIT (OUTPATIENT)
Dept: INTERNAL MEDICINE | Facility: CLINIC | Age: 54
End: 2024-11-01
Payer: COMMERCIAL

## 2024-11-01 VITALS
SYSTOLIC BLOOD PRESSURE: 120 MMHG | BODY MASS INDEX: 24.41 KG/M2 | HEART RATE: 60 BPM | DIASTOLIC BLOOD PRESSURE: 80 MMHG | RESPIRATION RATE: 14 BRPM | HEIGHT: 64 IN | OXYGEN SATURATION: 97 % | TEMPERATURE: 97.7 F | WEIGHT: 143 LBS

## 2024-11-01 DIAGNOSIS — Z83.719 FAMILY HISTORY OF COLONIC POLYPS: Chronic | ICD-10-CM

## 2024-11-01 DIAGNOSIS — E55.9 VITAMIN D DEFICIENCY: Chronic | ICD-10-CM

## 2024-11-01 DIAGNOSIS — T46.4X5A ACE-INHIBITOR COUGH: Chronic | ICD-10-CM

## 2024-11-01 DIAGNOSIS — R31.29 MICROSCOPIC HEMATURIA: Chronic | ICD-10-CM

## 2024-11-01 DIAGNOSIS — I10 BENIGN ESSENTIAL HYPERTENSION: Chronic | ICD-10-CM

## 2024-11-01 DIAGNOSIS — E78.2 MIXED HYPERLIPIDEMIA: Chronic | ICD-10-CM

## 2024-11-01 DIAGNOSIS — Z86.0100 HISTORY OF COLON POLYPS: Chronic | ICD-10-CM

## 2024-11-01 DIAGNOSIS — I25.10 OCCLUSIVE CORONARY ARTERY DISEASE: Chronic | ICD-10-CM

## 2024-11-01 DIAGNOSIS — Z51.81 THERAPEUTIC DRUG MONITORING: ICD-10-CM

## 2024-11-01 DIAGNOSIS — Z00.00 ROUTINE PHYSICAL EXAMINATION: Primary | ICD-10-CM

## 2024-11-01 DIAGNOSIS — Z82.49 FAMILY HISTORY OF PREMATURE CORONARY ARTERY DISEASE: Chronic | ICD-10-CM

## 2024-11-01 DIAGNOSIS — E03.8 SECONDARY HYPOTHYROIDISM: Chronic | ICD-10-CM

## 2024-11-01 DIAGNOSIS — R05.8 ACE-INHIBITOR COUGH: Chronic | ICD-10-CM

## 2024-11-01 DIAGNOSIS — Z92.89 HISTORY OF POSITIVE PPD: Chronic | ICD-10-CM

## 2024-11-01 DIAGNOSIS — Z95.1 HX OF CABG: Chronic | ICD-10-CM

## 2024-11-01 DIAGNOSIS — R73.01 IMPAIRED FASTING GLUCOSE: Chronic | ICD-10-CM

## 2024-11-01 RX ORDER — ROSUVASTATIN CALCIUM 40 MG/1
TABLET, COATED ORAL
Qty: 90 TABLET | Refills: 3 | Status: SHIPPED | OUTPATIENT
Start: 2024-11-01

## 2024-11-01 RX ORDER — EZETIMIBE 10 MG/1
TABLET ORAL
Qty: 90 TABLET | Refills: 3 | Status: SHIPPED | OUTPATIENT
Start: 2024-11-01

## 2024-11-01 RX ORDER — LEVOTHYROXINE SODIUM 75 UG/1
TABLET ORAL
Qty: 90 TABLET | Refills: 3 | Status: SHIPPED | OUTPATIENT
Start: 2024-11-01

## 2024-11-01 RX ORDER — METOPROLOL SUCCINATE 50 MG/1
TABLET, EXTENDED RELEASE ORAL
Qty: 90 TABLET | Refills: 3 | Status: SHIPPED | OUTPATIENT
Start: 2024-11-01

## 2024-11-01 RX ORDER — IRBESARTAN 150 MG/1
TABLET ORAL
Qty: 90 TABLET | Refills: 3 | Status: SHIPPED | OUTPATIENT
Start: 2024-11-01

## 2024-11-01 NOTE — PROGRESS NOTES
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                                                                                          [address] [facility] [address]      1970  [unfilled]  There is no work phone number on file.    Chief Complaint:     Routine annual physical/preventative visit.  No new acute complaints.    History of Present Illness:    Patient with history of medical problems as outlined below in assessment and plan presents today for his routine annual physical/preventative visit.  His past medical history reviewed and updated were necessary including health maintenance parameters.  This reveals he may need influenza vaccine.  See below.    Review of Systems   Constitutional: Negative.   HENT: Negative.     Eyes: Negative.    Cardiovascular: Negative.    Respiratory: Negative.     Endocrine: Negative.    Hematologic/Lymphatic: Negative.    Skin: Negative.    Musculoskeletal: Negative.    Gastrointestinal: Negative.    Genitourinary: Negative.    Neurological: Negative.    Psychiatric/Behavioral: Negative.     Allergic/Immunologic: Negative.        Active Problems:    Patient Active Problem List   Diagnosis    Benign essential hypertension    Hyperlipidemia    Impaired fasting glucose    Secondary hypothyroidism    Vitamin D deficiency    Therapeutic drug monitoring    Routine physical examination    ACE-inhibitor cough    Family history of colonic polyps    History of colon polyps, 5/17/2019--tubular adenoma x2    History of positive PPD    Microscopic hematuria    Occlusive coronary artery disease, 6/17/2022--S/P OFF PUMP CORONARY ARTERY BYPASS GRAFT X 4 (LIMA-LAD,ROLY-OM SVG-DX;SVG-PLB)    Hx of CABG,  6/17/2022--S/P OFF PUMP CORONARY ARTERY BYPASS GRAFT X 4 (LIMA-LAD,ROLY-OM SVG-DX;SVG-PLB)    Family history of premature coronary artery disease         Past Medical History:   Diagnosis Date    ACE-inhibitor cough 4/22/2016 04/22/2016--patient presents with a  several month history of a dry cough related to the use of lisinopril.    Benign essential hypertension 2009--treatment for hypertension begun.    Family history of colonic polyps 2018    Patient's paternal grandfather and paternal uncle had a history of colon polyps.    Family history of premature coronary artery disease 2022    Mother  from coronary artery disease age 56.  Maternal cousin with premature coronary artery disease    History of colon polyps, 2019--tubular adenoma x2 2019    May 17, 2019--colonoscopy revealed a cecal polyp and a splenic flexure polyp.  Pathology returned tubular adenoma x2.    History of positive PPD 2019    Hx of CABG,  2022--S/P OFF PUMP CORONARY ARTERY BYPASS GRAFT X 4 (LIMA-LAD,ROLY-OM SVG-DX;SVG-PLB) 2022    Discharge Date 2022 Name Gustavo Mcclendon  Time 9:00 AM MRJEREMIE [id number]  Surgeon Dr.Hashmi Vega 1970  Admit 2022 10:07 AM CSN [id number]  Translation Services: Not Required [employer] ID #: None Final Diagnoses: Stable condition S/P OFF PUMP CORONARY ARTERY BYPASS GRAFT X 4 (LIMA-LAD,ROLY-OM SVG-DX;SVG-PLB) INTRAOP LOCALIZATION OF VASCULAR CONDUIT,RIGHT ENDOVEIN HARVEST,GRAFT PATENCY     Hyperlipidemia 1997--treatment for hyperlipidemia begun.    Impaired fasting glucose 2011--initial hemoglobin A1c 6.3.  2011--initial diagnosis of impaired fasting glucose with a serum glucose of 111.    Microscopic hematuria 2020    Occlusive coronary artery disease, 2022--S/P OFF PUMP CORONARY ARTERY BYPASS GRAFT X 4 (LIMA-LAD,ROLY-OM SVG-DX;SVG-PLB) 2022    Discharge Date 2022 Name Gustavo Mcclendon  Time 9:00 AM MRJEREMIE [id number]  Surgeon Dr.Hashmi Vega 1970  Admit 2022 10:07 AM CSN [id number]  Translation Services: Not Required [employer] ID #: None Final Diagnoses: Stable condition S/P OFF PUMP CORONARY ARTERY BYPASS GRAFT X 4 (LIMA-LAD,ROLY-OM SVG-DX;SVG-PLB)  INTRAOP LOCALIZATION OF VASCULAR CONDUIT,RIGHT ENDOVEIN HARVEST,GRAFT PATENCY     Secondary hypothyroidism 5/31/2013 04/25/2014--correction: patient did have a contrasted MRI of the pituitary 08/19/2013 and it did not show any evidence of pituitary tumor.  04/19/2014--TSH is low at 0.363, normal range 0.45--4.5. T4 borderline at 4.5, normal range 4.5--12.0. Free thyroxine index normal at 1.7, normal range 1.2--4.9. Patient reports he has symptoms of fatigue as well as cold intolerance and constipation and would like to try supplementation. Levothyroxine 75 mcg per day initiated. Followup in 5 weeks with lab prior.   10/21/2013--patient was seen in consultation by the endocrinologist and repeat thyroid function tests were performed. 3 T3 was normal at 3.1. Thyroid antibodies were normal. TSH was low normal range at 0.452. Free T4 was normal at 1.36, normal range 0.8 to--1.77. Patient was felt to be euthyroid and only needed continued observation.   05/31/2013--TSH was low at 0.390. Question secondary hypothyroid. Repeat thyroid function tests revealed TSH to be normal at 0.46, normal range 0.27--4.20. T3 was low at 78.    Vitamin D deficiency 3/28/2016         Past Surgical History:   Procedure Laterality Date    COLONOSCOPY  05/17/2019    May 17, 2019--colonoscopy revealed a cecal polyp and a splenic flexure polyp.  Pathology returned tubular adenoma x2.    COLONOSCOPY N/A 8/16/2023    Procedure: COLONOSCOPY, POLYPECTOMY;  Surgeon: Александр Vásquez MD;  Location: [facility] MAIN OR;  Service: Gastroenterology;  Laterality: N/A;  POLYP X 1    DENTAL PROCEDURE  2012 2012--dental implant.         No Known Allergies        Current Outpatient Medications:     aspirin 81 MG tablet, Take 1 tablet by mouth Daily., Disp: , Rfl:     Cholecalciferol (VITAMIN D3) 5000 UNITS capsule capsule, Take 1 capsule by mouth Daily., Disp: , Rfl:     ezetimibe (ZETIA) 10 MG tablet, TAKE 1 TABLET BY MOUTH DAILY FOR HIGH CHOLESTEROL,  "Disp: 90 tablet, Rfl: 1    irbesartan (AVAPRO) 150 MG tablet, TAKE 1 BY MOUTH EVERY MORNING FOR HIGH BLOOD PRESSURE, Disp: 90 tablet, Rfl: 1    levothyroxine (SYNTHROID, LEVOTHROID) 75 MCG tablet, TAKE 1 TABLET BY MOUTH DAILY FOR LOW THYROID, Disp: 90 tablet, Rfl: 3    metoprolol succinate XL (TOPROL-XL) 50 MG 24 hr tablet, Take 1 p.o. daily for heart and blood pressure, Disp: 90 tablet, Rfl: 3    rosuvastatin (CRESTOR) 40 MG tablet, TAKE 1 TABLET BY MOUTH EVERY DAY FOR CHOLESTEROL, Disp: 90 tablet, Rfl: 1      Family History   Problem Relation Age of Onset    Coronary artery disease Mother         Mother  at age 56 from coronary artery disease    Diabetes Mother     Hypertension Brother     Coronary artery disease Cousin         Mother sister and son with premature coronary artery disease, status post CABG         Social History     Socioeconomic History    Marital status:      Spouse name: jose    Highest education level: Professional school degree (e.g., MD, DDS, DVM, RICARDA)   Tobacco Use    Smoking status: Never    Smokeless tobacco: Never   Vaping Use    Vaping status: Never Used   Substance and Sexual Activity    Alcohol use: No    Drug use: No    Sexual activity: Yes     Partners: Female         Vitals:    24 1518   BP: 120/80   Pulse: 60   Resp: 14   Temp: 97.7 °F (36.5 °C)   TempSrc: Temporal   SpO2: 97%   Weight: 64.9 kg (143 lb)   Height: 162.6 cm (64\")        Body mass index is 24.55 kg/m².      Physical Exam:    General: Alert and oriented x 3.  No acute distress.  Normal affect.  HEENT: Pupils equal, round, reactive to light; extraocular movements intact; sclerae nonicteric; pharynx, ear canals and TMs normal.  Neck: Without JVD, thyromegaly, bruit, or adenopathy.  Lungs: Clear to auscultation in all fields.  Heart: Regular rate and rhythm without murmur, rub, gallop, or click.  Abdomen: Soft, nontender, without hepatosplenomegaly or hernia.  Bowel sounds normal.  : Deferred.  Rectal: " Deferred.  Extremities: Without clubbing, cyanosis, edema, or pulse deficit.  Neurologic: Intact without focal deficit.  Normal station and gait observed during ingress and egress from the examination room.  Skin: Without significant lesion.  Musculoskeletal: Unremarkable.    Lab/other results:    Vitamin D normal at 71.7.  CBC normal except hemoglobin slightly low at 12.9 but hematocrit normal at 39.6.  CK elevated at 428.  CMP normal except glucose 110.  Hemoglobin A1c 5.8.  NMR reveals total cholesterol 157, triglycerides 57, LDL particle #1391, HDL particle #31.1.  Thyroid function tests are normal.  PSA normal at 1.43.  Lipoprotein a 56.2.  Urinalysis normal.    Assessment/Plan:     Diagnosis Plan   1. Routine physical examination        2. Impaired fasting glucose        3. Hyperlipidemia        4. Benign essential hypertension        5. Secondary hypothyroidism        6. Vitamin D deficiency        7. ACE-inhibitor cough        8. History of colon polyps, 5/17/2019--tubular adenoma x2        9. History of positive PPD        10. Occlusive coronary artery disease, 6/17/2022--S/P OFF PUMP CORONARY ARTERY BYPASS GRAFT X 4 (LIMA-LAD,ROLY-OM SVG-DX;SVG-PLB)        11. Hx of CABG,  6/17/2022--S/P OFF PUMP CORONARY ARTERY BYPASS GRAFT X 4 (LIMA-LAD,ROLY-OM SVG-DX;SVG-PLB)        12. Family history of premature coronary artery disease        13. Therapeutic drug monitoring        14. Microscopic hematuria        15. Family history of colonic polyps          Patient presents for his routine annual physical exam and seems to have stable medical problems as listed.  Patient saw the [facility] lipid clinic and they recommended the addition of Repatha and semaglutide which I certainly agree with.  However, they have been having trouble with obtaining approval from insurance company.    Several preventative health issues discussed including review of vaccinations and recommendations, including dietary issues,  exercise and weight loss.  Safe sex practices discussed.  Patient advised to wear seatbelt whenever driving and avoid texting and driving.  Also advised to look both ways before crossing the street.  Colon cancer prevention discussed and is up-to-date with colonoscopy.  Advised to avoid tobacco products and minimize alcohol consumption.    Plan is as follows: The lipid clinic will continue to try to get insurance approval for the semaglutide and the Repatha.  Patient clearly meets the requirements for Repatha.  He has known coronary artery disease and is on maximum tolerated dose of statin in addition to ezetimibe and still is not at goal.  This should qualify patient for coverage of the Repatha.  In regards to the diabetes he has prediabetes but no overt diabetes and chances are we will not be able to get this approved through the insurance.    Procedures

## 2025-04-22 ENCOUNTER — LAB (OUTPATIENT)
Dept: LAB | Facility: HOSPITAL | Age: 55
End: 2025-04-22
Payer: COMMERCIAL

## 2025-04-22 DIAGNOSIS — E55.9 VITAMIN D DEFICIENCY: Chronic | ICD-10-CM

## 2025-04-22 DIAGNOSIS — E78.2 MIXED HYPERLIPIDEMIA: Chronic | ICD-10-CM

## 2025-04-22 DIAGNOSIS — Z51.81 THERAPEUTIC DRUG MONITORING: ICD-10-CM

## 2025-04-22 DIAGNOSIS — E03.8 SECONDARY HYPOTHYROIDISM: Chronic | ICD-10-CM

## 2025-04-22 DIAGNOSIS — R73.01 IMPAIRED FASTING GLUCOSE: Chronic | ICD-10-CM

## 2025-04-22 DIAGNOSIS — I10 BENIGN ESSENTIAL HYPERTENSION: Chronic | ICD-10-CM

## 2025-04-22 LAB
25(OH)D3 SERPL-MCNC: 77.3 NG/ML (ref 30–100)
ALBUMIN SERPL-MCNC: 3.9 G/DL (ref 3.5–5.2)
ALBUMIN/GLOB SERPL: 1.7 G/DL
ALP SERPL-CCNC: 86 U/L (ref 39–117)
ALT SERPL W P-5'-P-CCNC: 17 U/L (ref 1–41)
ANION GAP SERPL CALCULATED.3IONS-SCNC: 4.7 MMOL/L (ref 5–15)
AST SERPL-CCNC: 19 U/L (ref 1–40)
BILIRUB SERPL-MCNC: 0.3 MG/DL (ref 0–1.2)
BUN SERPL-MCNC: 20 MG/DL (ref 6–20)
BUN/CREAT SERPL: 20.2 (ref 7–25)
CALCIUM SPEC-SCNC: 8.7 MG/DL (ref 8.6–10.5)
CHLORIDE SERPL-SCNC: 110 MMOL/L (ref 98–107)
CK SERPL-CCNC: 76 U/L (ref 20–200)
CO2 SERPL-SCNC: 28.3 MMOL/L (ref 22–29)
CREAT SERPL-MCNC: 0.99 MG/DL (ref 0.76–1.27)
DEPRECATED RDW RBC AUTO: 38 FL (ref 37–54)
EGFRCR SERPLBLD CKD-EPI 2021: 90 ML/MIN/1.73
ERYTHROCYTE [DISTWIDTH] IN BLOOD BY AUTOMATED COUNT: 12 % (ref 12.3–15.4)
GLOBULIN UR ELPH-MCNC: 2.3 GM/DL
GLUCOSE SERPL-MCNC: 98 MG/DL (ref 65–99)
HBA1C MFR BLD: 5.3 % (ref 4.8–5.6)
HCT VFR BLD AUTO: 39.8 % (ref 37.5–51)
HGB BLD-MCNC: 12.9 G/DL (ref 13–17.7)
MCH RBC QN AUTO: 28 PG (ref 26.6–33)
MCHC RBC AUTO-ENTMCNC: 32.4 G/DL (ref 31.5–35.7)
MCV RBC AUTO: 86.5 FL (ref 79–97)
PLATELET # BLD AUTO: 223 10*3/MM3 (ref 140–450)
PMV BLD AUTO: 11.1 FL (ref 6–12)
POTASSIUM SERPL-SCNC: 4.3 MMOL/L (ref 3.5–5.2)
PROT SERPL-MCNC: 6.2 G/DL (ref 6–8.5)
RBC # BLD AUTO: 4.6 10*6/MM3 (ref 4.14–5.8)
SODIUM SERPL-SCNC: 143 MMOL/L (ref 136–145)
T3FREE SERPL-MCNC: 2.9 PG/ML (ref 2–4.4)
T4 FREE SERPL-MCNC: 1.91 NG/DL (ref 0.92–1.68)
TSH SERPL DL<=0.05 MIU/L-ACNC: 0.46 UIU/ML (ref 0.27–4.2)
WBC NRBC COR # BLD AUTO: 5.01 10*3/MM3 (ref 3.4–10.8)

## 2025-04-22 PROCEDURE — 82306 VITAMIN D 25 HYDROXY: CPT

## 2025-04-22 PROCEDURE — 83036 HEMOGLOBIN GLYCOSYLATED A1C: CPT

## 2025-04-22 PROCEDURE — 36415 COLL VENOUS BLD VENIPUNCTURE: CPT

## 2025-04-22 PROCEDURE — 80053 COMPREHEN METABOLIC PANEL: CPT

## 2025-04-22 PROCEDURE — 84439 ASSAY OF FREE THYROXINE: CPT

## 2025-04-22 PROCEDURE — 85027 COMPLETE CBC AUTOMATED: CPT

## 2025-04-22 PROCEDURE — 84481 FREE ASSAY (FT-3): CPT

## 2025-04-22 PROCEDURE — 84443 ASSAY THYROID STIM HORMONE: CPT

## 2025-04-22 PROCEDURE — 82550 ASSAY OF CK (CPK): CPT

## 2025-04-22 PROCEDURE — 83704 LIPOPROTEIN BLD QUAN PART: CPT

## 2025-04-22 PROCEDURE — 80061 LIPID PANEL: CPT

## 2025-04-23 LAB
CHOLEST SERPL-MCNC: 84 MG/DL (ref 100–199)
HDL SERPL-SCNC: 35.4 UMOL/L
HDLC SERPL-MCNC: 51 MG/DL
LDL SERPL QN: ABNORMAL NM
LDL SERPL-SCNC: <300 NMOL/L
LDL SMALL SERPL-SCNC: 90 NMOL/L
LDLC SERPL CALC-MCNC: 19 MG/DL (ref 0–99)
TRIGL SERPL-MCNC: 56 MG/DL (ref 0–149)

## 2025-05-20 ENCOUNTER — OFFICE VISIT (OUTPATIENT)
Dept: INTERNAL MEDICINE | Facility: CLINIC | Age: 55
End: 2025-05-20
Payer: COMMERCIAL

## 2025-05-20 VITALS
DIASTOLIC BLOOD PRESSURE: 72 MMHG | SYSTOLIC BLOOD PRESSURE: 118 MMHG | BODY MASS INDEX: 23.05 KG/M2 | RESPIRATION RATE: 16 BRPM | HEART RATE: 70 BPM | HEIGHT: 64 IN | TEMPERATURE: 98.4 F | OXYGEN SATURATION: 99 % | WEIGHT: 135 LBS

## 2025-05-20 DIAGNOSIS — Z95.1 HX OF CABG: Chronic | ICD-10-CM

## 2025-05-20 DIAGNOSIS — Z00.00 ROUTINE PHYSICAL EXAMINATION: ICD-10-CM

## 2025-05-20 DIAGNOSIS — E78.2 MIXED HYPERLIPIDEMIA: Chronic | ICD-10-CM

## 2025-05-20 DIAGNOSIS — Z82.49 FAMILY HISTORY OF PREMATURE CORONARY ARTERY DISEASE: Chronic | ICD-10-CM

## 2025-05-20 DIAGNOSIS — Z83.719 FAMILY HISTORY OF COLONIC POLYPS: Chronic | ICD-10-CM

## 2025-05-20 DIAGNOSIS — I10 BENIGN ESSENTIAL HYPERTENSION: Chronic | ICD-10-CM

## 2025-05-20 DIAGNOSIS — I25.10 OCCLUSIVE CORONARY ARTERY DISEASE: Chronic | ICD-10-CM

## 2025-05-20 DIAGNOSIS — R73.01 IMPAIRED FASTING GLUCOSE: Primary | Chronic | ICD-10-CM

## 2025-05-20 DIAGNOSIS — Z86.0100 HISTORY OF COLON POLYPS: Chronic | ICD-10-CM

## 2025-05-20 DIAGNOSIS — Z51.81 THERAPEUTIC DRUG MONITORING: ICD-10-CM

## 2025-05-20 DIAGNOSIS — R31.29 MICROSCOPIC HEMATURIA: Chronic | ICD-10-CM

## 2025-05-20 DIAGNOSIS — E55.9 VITAMIN D DEFICIENCY: Chronic | ICD-10-CM

## 2025-05-20 DIAGNOSIS — E03.8 SECONDARY HYPOTHYROIDISM: Chronic | ICD-10-CM

## 2025-05-20 PROCEDURE — 99214 OFFICE O/P EST MOD 30 MIN: CPT | Performed by: INTERNAL MEDICINE

## 2025-05-20 RX ORDER — ROSUVASTATIN CALCIUM 20 MG/1
TABLET, COATED ORAL
Qty: 90 TABLET | Refills: 3 | Status: SHIPPED | OUTPATIENT
Start: 2025-05-20

## 2025-05-20 RX ORDER — TIRZEPATIDE 2.5 MG/.5ML
INJECTION, SOLUTION SUBCUTANEOUS
COMMUNITY
End: 2025-05-20

## 2025-05-20 RX ORDER — HYDROCHLOROTHIAZIDE 25 MG/1
TABLET ORAL
Qty: 90 TABLET | Refills: 3 | Status: SHIPPED | OUTPATIENT
Start: 2025-05-20

## 2025-05-20 RX ORDER — EVOLOCUMAB 140 MG/ML
140 INJECTION, SOLUTION SUBCUTANEOUS
COMMUNITY
End: 2025-05-20 | Stop reason: SDUPTHER

## 2025-05-20 RX ORDER — EVOLOCUMAB 140 MG/ML
140 INJECTION, SOLUTION SUBCUTANEOUS
Qty: 6 ML | Refills: 3 | Status: SHIPPED | OUTPATIENT
Start: 2025-05-20

## 2025-05-20 RX ORDER — METOPROLOL SUCCINATE 25 MG/1
TABLET, EXTENDED RELEASE ORAL
Qty: 90 TABLET | Refills: 3 | Status: SHIPPED | OUTPATIENT
Start: 2025-05-20

## 2025-05-20 NOTE — PROGRESS NOTES
05/20/2025    Patient Information  Gustavo Mcclendon Dr.                                                                                          [address] [place] [address]      1970  [unfilled]  There is no work phone number on file.    Chief Complaint:     Follow-up chronic medical problems and recent lab work.  No new acute complaints.    History of Present Illness:    Patient with a history of multiple chronic medical problems as noted below in assessment and plan presents today for follow-up with lab prior in order to monitor his chronic medical issues.  Past medical history reviewed and updated were necessary including health maintenance parameters.  This reveals he is currently up-to-date ELIZALDE count 4.    Patient has been erroneously marked as diabetic. Based on the available clinical information, he does not have diabetes and should therefore be excluded from diabetic health maintenance and quality measures for the remainder of the reporting period.     Review of Systems   Constitutional: Negative.   HENT: Negative.     Eyes: Negative.    Cardiovascular: Negative.    Respiratory: Negative.     Endocrine: Negative.    Hematologic/Lymphatic: Negative.    Skin: Negative.    Musculoskeletal: Negative.    Gastrointestinal: Negative.    Genitourinary: Negative.    Neurological: Negative.    Psychiatric/Behavioral: Negative.     Allergic/Immunologic: Negative.        Active Problems:    Patient Active Problem List   Diagnosis    Benign essential hypertension    Hyperlipidemia    Impaired fasting glucose    Secondary hypothyroidism    Vitamin D deficiency    Therapeutic drug monitoring    Routine physical examination    ACE-inhibitor cough    Family history of colonic polyps    History of colon polyps, 5/17/2019--tubular adenoma x2    History of positive PPD    Microscopic hematuria    Occlusive coronary artery disease, 6/17/2022--S/P OFF PUMP CORONARY ARTERY BYPASS GRAFT X 4  (LIMA-LAD,ROLY-OM SVG-DX;SVG-PLB)    Hx of CABG,  2022--S/P OFF PUMP CORONARY ARTERY BYPASS GRAFT X 4 (LIMA-LAD,ROLY-OM SVG-DX;SVG-PLB)    Family history of premature coronary artery disease         Past Medical History:   Diagnosis Date    ACE-inhibitor cough 2016--patient presents with a several month history of a dry cough related to the use of lisinopril.    Benign essential hypertension 2009--treatment for hypertension begun.    Family history of colonic polyps 2018    Patient's paternal grandfather and paternal uncle had a history of colon polyps.    Family history of premature coronary artery disease 2022    Mother  from coronary artery disease age 56.  Maternal cousin with premature coronary artery disease    History of colon polyps, 2019--tubular adenoma x2 2019    May 17, 2019--colonoscopy revealed a cecal polyp and a splenic flexure polyp.  Pathology returned tubular adenoma x2.    History of positive PPD 2019    Hx of CABG,  2022--S/P OFF PUMP CORONARY ARTERY BYPASS GRAFT X 4 (LIMA-LAD,ROLY-OM SVG-DX;SVG-PLB) 2022    Discharge Date 2022 Name Gustavo Mcclendon  Time 9:00 AM MRN [id number]  Surgeon Dr.Hashmi Vega 1970  Admit 2022 10:07 AM CSN [id number]  Translation Services: Not Required [employer] ID #: None Final Diagnoses: Stable condition S/P OFF PUMP CORONARY ARTERY BYPASS GRAFT X 4 (LIMA-LAD,ROLY-OM SVG-DX;SVG-PLB) INTRAOP LOCALIZATION OF VASCULAR CONDUIT,RIGHT ENDOVEIN HARVEST,GRAFT PATENCY     Hyperlipidemia 1997--treatment for hyperlipidemia begun.    Impaired fasting glucose 2011--initial hemoglobin A1c 6.3.  2011--initial diagnosis of impaired fasting glucose with a serum glucose of 111.    Microscopic hematuria 2020    Occlusive coronary artery disease, 2022--S/P OFF PUMP CORONARY ARTERY BYPASS GRAFT X 4 (LIMA-LAD,ROLY-OM SVG-DX;SVG-PLB) 2022     Discharge Date 2022 Name Gustavo Mcclendon  Time 9:00 AM MRN [id number]  Surgeon Dr.Hashmi Vega 1970  Admit 2022 10:07 AM [facility] [phone]  Translation Services: Not Required Cod[employer] ID #: None Final Diagnoses: Stable condition S/P OFF PUMP CORONARY ARTERY BYPASS GRAFT X 4 (LIMA-LAD,ROLY-OM SVG-DX;SVG-PLB) INTRAOP LOCALIZATION OF VASCULAR CONDUIT,RIGHT ENDOVEIN HARVEST,GRAFT PATENCY     Secondary hypothyroidism 2014--correction: patient did have a contrasted MRI of the pituitary 2013 and it did not show any evidence of pituitary tumor.  2014--TSH is low at 0.363, normal range 0.45--4.5. T4 borderline at 4.5, normal range 4.5--12.0. Free thyroxine index normal at 1.7, normal range 1.2--4.9. Patient reports he has symptoms of fatigue as well as cold intolerance and constipation and would like to try supplementation. Levothyroxine 75 mcg per day initiated. Followup in 5 weeks with lab prior.   10/21/2013--patient was seen in consultation by the endocrinologist and repeat thyroid function tests were performed. 3 T3 was normal at 3.1. Thyroid antibodies were normal. TSH was low normal range at 0.452. Free T4 was normal at 1.36, normal range 0.8 to--1.77. Patient was felt to be euthyroid and only needed continued observation.   2013--TSH was low at 0.390. Question secondary hypothyroid. Repeat thyroid function tests revealed TSH to be normal at 0.46, normal range 0.27--4.20. T3 was low at 78.    Vitamin D deficiency 3/28/2016         Past Surgical History:   Procedure Laterality Date    COLONOSCOPY  2019    May 17, 2019--colonoscopy revealed a cecal polyp and a splenic flexure polyp.  Pathology returned tubular adenoma x2.    COLONOSCOPY N/A 2023    Procedure: COLONOSCOPY, POLYPECTOMY;  Surgeon: Александр Vásquez MD;  [place] [facility] [place] [facility] [place];  Service: Gastroenterology;  Laterality: N/A;  POLYP X 1    DENTAL PROCEDURE  2012--dental implant.  "        No Known Allergies        Current Outpatient Medications:     aspirin 81 MG tablet, Take 1 tablet by mouth Daily., Disp: , Rfl:     Cholecalciferol (VITAMIN D3) 5000 UNITS capsule capsule, Take 1 capsule by mouth Daily., Disp: , Rfl:     ezetimibe (ZETIA) 10 MG tablet, TAKE 1 TABLET BY MOUTH DAILY FOR HIGH CHOLESTEROL, Disp: 90 tablet, Rfl: 3    irbesartan (AVAPRO) 150 MG tablet, TAKE 1 BY MOUTH EVERY MORNING FOR HIGH BLOOD PRESSURE, Disp: 90 tablet, Rfl: 3    levothyroxine (SYNTHROID, LEVOTHROID) 75 MCG tablet, TAKE 1 TABLET BY MOUTH DAILY FOR LOW THYROID, Disp: 90 tablet, Rfl: 3    metoprolol succinate XL (TOPROL-XL) 50 MG 24 hr tablet, Take 1 p.o. daily for heart and blood pressure, Disp: 90 tablet, Rfl: 3    rosuvastatin (CRESTOR) 40 MG tablet, TAKE 1 TABLET BY MOUTH EVERY DAY FOR CHOLESTEROL, Disp: 90 tablet, Rfl: 3      Family History   Problem Relation Age of Onset    Coronary artery disease Mother         Mother  at age 56 from coronary artery disease    Diabetes Mother     Hypertension Brother     Coronary artery disease Cousin         Mother sister and son with premature coronary artery disease, status post CABG         Social History     Socioeconomic History    Marital status:      Spouse name: jose    Highest education level: Professional school degree (e.g., MD, DDS, DVM, RICARDA)   Tobacco Use    Smoking status: Never    Smokeless tobacco: Never   Vaping Use    Vaping status: Never Used   Substance and Sexual Activity    Alcohol use: No    Drug use: No    Sexual activity: Yes     Partners: Female         Vitals:    25 0749   BP: 118/72   Pulse: 70   Resp: 16   Temp: 98.4 °F (36.9 °C)   TempSrc: Oral   SpO2: 99%   Weight: 61.2 kg (135 lb)   Height: 162.6 cm (64.02\")        Body mass index is 23.16 kg/m².      Physical Exam:    General: Alert and oriented x 3.  No acute distress.  Normal affect.  HEENT: Pupils equal, round, reactive to light; extraocular movements intact; sclerae " nonicteric; pharynx, ear canals and TMs normal.  Neck: Without JVD, thyromegaly, bruit, or adenopathy.  Lungs: Clear to auscultation in all fields.  Heart: Regular rate and rhythm without murmur, rub, gallop, or click.  Abdomen: Soft, nontender, without hepatosplenomegaly or hernia.  Bowel sounds normal.  : Deferred.  Rectal: Deferred.  Extremities: Without clubbing, cyanosis, edema, or pulse deficit.  Neurologic: Intact without focal deficit.  Normal station and gait observed during ingress and egress from the examination room.  Skin: Without significant lesion.  Musculoskeletal: Unremarkable.    Lab/other results:    CBC normal except hemoglobin 12.9.  CK normal at 76.  CMP normal and hemoglobin A1c 5.3.  Total cholesterol 84, triglycerides 56, LDL particle number less than 300, HDL particle #35.4.  Thyroid function tests are essentially normal.  Vitamin D normal at 77.3.    Assessment/Plan:     Diagnosis Plan   1. Impaired fasting glucose        2. Hyperlipidemia        3. Benign essential hypertension        4. Secondary hypothyroidism        5. Vitamin D deficiency        6. Microscopic hematuria        7. Occlusive coronary artery disease, 6/17/2022--S/P OFF PUMP CORONARY ARTERY BYPASS GRAFT X 4 (LIMA-LAD,ROLY-OM SVG-DX;SVG-PLB)        8. Hx of CABG,  6/17/2022--S/P OFF PUMP CORONARY ARTERY BYPASS GRAFT X 4 (LIMA-LAD,ROLY-OM SVG-DX;SVG-PLB)        9. Family history of premature coronary artery disease        10. History of colon polyps, 5/17/2019--tubular adenoma x2        11. Family history of colonic polyps        12. Therapeutic drug monitoring          Patient has impaired fasting glucose and his A1c is normal.  He does not have diabetes.  He has impaired fasting glucose.  Hyperlipidemia is under excellent control which is very important given his occlusive coronary artery disease and history of CABG.  He has secondary hypothyroidism and his thyroid function tests are adequate.  Vitamin D in normal range  with supplementation.  He has microscopic hematuria history that has been evaluated in the past.  He has a history of colon polyps and is up-to-date on his colonoscopy.  He has prescriptions extensively updated and corrected today.  Multiple changes.    Plan is as follows: Will schedule patient for his annual physical after November 1, 2025 with lab prior.  Recommend Prevnar 20.  Also recommend hepatitis B vaccine if patient is not already received it.  Discontinue ezetimibe.  Prescriptions updated as noted above.        Procedures

## 2025-07-08 ENCOUNTER — PRIOR AUTHORIZATION (OUTPATIENT)
Dept: INTERNAL MEDICINE | Facility: CLINIC | Age: 55
End: 2025-07-08
Payer: COMMERCIAL

## 2025-07-08 NOTE — TELEPHONE ENCOUNTER
PA has been started for Gustavo Mcclendon (Key: [id number]  Mounjaro 2.5MG/0.5ML auto-injectors  Waiting for response

## 2025-07-17 ENCOUNTER — PRIOR AUTHORIZATION (OUTPATIENT)
Dept: INTERNAL MEDICINE | Facility: CLINIC | Age: 55
End: 2025-07-17
Payer: COMMERCIAL

## 2025-07-17 NOTE — TELEPHONE ENCOUNTER
Gustavo Mcclendon (Key: [id number])  Rx #: [id number]  Mounjaro 2.5MG/0.5ML auto-injectors  Has been Denied .     ? There is no indication that the patient has type 2 diabetes mellitus. Glucagon-Like Peptide-1   Agonists for any other use is considered not medically necessary, including the following   (this list may not be all inclusive; criteria will be updated as new published data are   available): 1. Weight Loss Treatment; 2. Type 1 Diabetes Mellitus; 3. Prediabetes/Diabetes   Prevention; 4. Metabolic Syndrome. Documentation is required to support this information.  ? Mounjaro is considered medically necessary when the following is met: FDA-Approved   Indication. 1. Type 2 Diabetes Mellitus. Approve for 1 year if the patient meets the following:   A) Diagnosis of Type 2 diabetes mellitus [Documentation Required]. When coverage is   available and medically necessary, the dosage, frequency, duration of therapy, and site of   care should be reasonable, clinically appropriate, and supported by evidence-based   literature and adjusted based upon severity, alternative available treatments, and previous   License Buddy. on behalf of your employer plan  response to therapy. Receipt of sample product does not satisfy any criteria requirements   for coverage. Glucagon-Like Peptide-1 Agonists for any other use is considered not   medically necessary, including the following (this list may not be all inclusive; criteria will be   updated as new published data are available): 1. Weight Loss Treatment. 2. Type 1   Diabetes Mellitus. 3. Prediabetes/Diabetes Prevention. 4. Metabolic Syndrome. 5.   Concomitant Use with Glucagon-Like Peptide-1 (GLP-1) Agonists or GLP-1/GlucoseDependent Insulinotropic Polypeptide (GIP) Agon

## (undated) DEVICE — CANN O2 ETCO2 FITS ALL CONN CO2 SMPL A/ 7IN DISP LF

## (undated) DEVICE — LASSO POLYPECTOMY SNARE: Brand: LASSO

## (undated) DEVICE — Device

## (undated) DEVICE — VIAL FORMLN CAP 10PCT 20ML

## (undated) DEVICE — GOWN ISOL W/THUMB UNIV BLU BX/15

## (undated) DEVICE — GOWN ,SIRUS,NONREINFORCED 3XL: Brand: MEDLINE

## (undated) DEVICE — ADAPT CLN SCPE ENDO PORPOISE BX/50 DISP

## (undated) DEVICE — SINGLE-USE BIOPSY FORCEPS: Brand: RADIAL JAW 4

## (undated) DEVICE — FLEX ADVANTAGE 1500CC: Brand: FLEX ADVANTAGE

## (undated) DEVICE — KT ORCA ORCAPOD DISP STRL